# Patient Record
Sex: FEMALE | Race: WHITE | Employment: OTHER | URBAN - METROPOLITAN AREA
[De-identification: names, ages, dates, MRNs, and addresses within clinical notes are randomized per-mention and may not be internally consistent; named-entity substitution may affect disease eponyms.]

---

## 2017-01-01 ENCOUNTER — HOSPITAL ENCOUNTER (OUTPATIENT)
Dept: MAMMOGRAPHY | Facility: HOSPITAL | Age: 72
Discharge: HOME | End: 2017-01-01
Attending: INTERNAL MEDICINE
Payer: MEDICARE

## 2017-01-01 DIAGNOSIS — Z12.39 SCREENING FOR BREAST CANCER: ICD-10-CM

## 2017-01-01 PROCEDURE — 77063 BREAST TOMOSYNTHESIS BI: CPT | Performed by: INTERNAL MEDICINE

## 2017-01-01 PROCEDURE — 77067 SCR MAMMO BI INCL CAD: CPT | Performed by: INTERNAL MEDICINE

## 2017-01-10 ENCOUNTER — HOSPITAL ENCOUNTER (OUTPATIENT)
Dept: GENERAL RADIOLOGY | Facility: HOSPITAL | Age: 72
Discharge: HOME | End: 2017-01-10
Attending: INTERNAL MEDICINE
Payer: MEDICARE

## 2017-01-10 ENCOUNTER — LAB ENCOUNTER (OUTPATIENT)
Dept: LAB | Facility: HOSPITAL | Age: 72
End: 2017-01-10
Attending: INTERNAL MEDICINE
Payer: MEDICARE

## 2017-01-10 DIAGNOSIS — Z12.31 ENCOUNTER FOR MAMMOGRAM TO ESTABLISH BASELINE MAMMOGRAM: Primary | ICD-10-CM

## 2017-01-10 DIAGNOSIS — E78.6 LIPOPROTEIN DEFICIENCIES: ICD-10-CM

## 2017-01-10 DIAGNOSIS — Z12.31 ENCOUNTER FOR MAMMOGRAM TO ESTABLISH BASELINE MAMMOGRAM: ICD-10-CM

## 2017-01-10 DIAGNOSIS — E72.00: ICD-10-CM

## 2017-01-10 DIAGNOSIS — Z72.0 TOBACCO ABUSE: ICD-10-CM

## 2017-01-10 LAB
ALBUMIN SERPL BCP-MCNC: 3.9 G/DL (ref 3.5–4.8)
ALBUMIN SERPL BCP-MCNC: 3.9 G/DL (ref 3.5–4.8)
ALP SERPL-CCNC: 88 U/L (ref 32–100)
ALT SERPL-CCNC: 15 U/L (ref 14–54)
ANION GAP SERPL CALC-SCNC: 8 MMOL/L (ref 0–18)
AST SERPL-CCNC: 19 U/L (ref 15–41)
BILIRUB DIRECT SERPL-MCNC: 0.1 MG/DL (ref 0–0.2)
BILIRUB SERPL-MCNC: 0.8 MG/DL (ref 0.3–1.2)
BUN SERPL-MCNC: 15 MG/DL (ref 8–20)
BUN/CREAT SERPL: 18.5 (ref 10–20)
CALCIUM SERPL-MCNC: 9.2 MG/DL (ref 8.5–10.5)
CHLORIDE SERPL-SCNC: 102 MMOL/L (ref 95–110)
CHOLEST SERPL-MCNC: 195 MG/DL (ref 110–200)
CO2 SERPL-SCNC: 29 MMOL/L (ref 22–32)
CREAT SERPL-MCNC: 0.81 MG/DL (ref 0.5–1.5)
GLUCOSE SERPL-MCNC: 91 MG/DL (ref 70–99)
HDLC SERPL-MCNC: 49 MG/DL
LDLC SERPL CALC-MCNC: 124 MG/DL (ref 0–99)
NONHDLC SERPL-MCNC: 146 MG/DL
OSMOLALITY UR CALC.SUM OF ELEC: 288 MOSM/KG (ref 275–295)
PHOSPHATE SERPL-MCNC: 3.3 MG/DL (ref 2.4–4.7)
POTASSIUM SERPL-SCNC: 3.8 MMOL/L (ref 3.3–5.1)
PROT SERPL-MCNC: 6.8 G/DL (ref 5.9–8.4)
SODIUM SERPL-SCNC: 139 MMOL/L (ref 136–144)
TRIGL SERPL-MCNC: 109 MG/DL (ref 1–149)

## 2017-01-10 PROCEDURE — 80061 LIPID PANEL: CPT

## 2017-01-10 PROCEDURE — 71020 XR CHEST PA + LAT CHEST (CPT=71020): CPT

## 2017-01-10 PROCEDURE — 80076 HEPATIC FUNCTION PANEL: CPT

## 2017-01-10 PROCEDURE — 80069 RENAL FUNCTION PANEL: CPT

## 2017-01-10 PROCEDURE — 36415 COLL VENOUS BLD VENIPUNCTURE: CPT

## 2017-06-23 PROBLEM — E78.00 HIGH CHOLESTEROL: Status: ACTIVE | Noted: 2017-01-01

## 2018-01-01 ENCOUNTER — APPOINTMENT (OUTPATIENT)
Dept: CV DIAGNOSTICS | Facility: HOSPITAL | Age: 73
DRG: 291 | End: 2018-01-01
Attending: INTERNAL MEDICINE
Payer: MEDICARE

## 2018-01-01 ENCOUNTER — APPOINTMENT (OUTPATIENT)
Dept: GENERAL RADIOLOGY | Facility: HOSPITAL | Age: 73
DRG: 291 | End: 2018-01-01
Attending: EMERGENCY MEDICINE
Payer: MEDICARE

## 2018-01-01 ENCOUNTER — APPOINTMENT (OUTPATIENT)
Dept: CT IMAGING | Facility: HOSPITAL | Age: 73
DRG: 291 | End: 2018-01-01
Attending: EMERGENCY MEDICINE
Payer: MEDICARE

## 2018-01-01 ENCOUNTER — APPOINTMENT (OUTPATIENT)
Dept: GENERAL RADIOLOGY | Facility: HOSPITAL | Age: 73
DRG: 291 | End: 2018-01-01
Attending: INTERNAL MEDICINE
Payer: MEDICARE

## 2018-01-01 ENCOUNTER — HOSPITAL ENCOUNTER (INPATIENT)
Facility: HOSPITAL | Age: 73
LOS: 11 days | Discharge: HOSPICE | DRG: 291 | End: 2018-01-01
Attending: EMERGENCY MEDICINE | Admitting: INTERNAL MEDICINE
Payer: MEDICARE

## 2018-01-01 ENCOUNTER — APPOINTMENT (OUTPATIENT)
Dept: LAB | Facility: HOSPITAL | Age: 73
End: 2018-01-01
Attending: INTERNAL MEDICINE
Payer: MEDICARE

## 2018-01-01 VITALS
BODY MASS INDEX: 26.45 KG/M2 | WEIGHT: 141.88 LBS | RESPIRATION RATE: 34 BRPM | HEIGHT: 61.5 IN | TEMPERATURE: 101 F | SYSTOLIC BLOOD PRESSURE: 136 MMHG | HEART RATE: 83 BPM | OXYGEN SATURATION: 90 % | DIASTOLIC BLOOD PRESSURE: 41 MMHG

## 2018-01-01 DIAGNOSIS — Z13.228 ENCOUNTER FOR SCREENING FOR METABOLIC DISORDER: ICD-10-CM

## 2018-01-01 DIAGNOSIS — G93.1 HYPOXIC ENCEPHALOPATHY (HCC): ICD-10-CM

## 2018-01-01 DIAGNOSIS — Z13.1 SCREENING FOR DIABETES MELLITUS: ICD-10-CM

## 2018-01-01 DIAGNOSIS — E87.2 METABOLIC ACIDOSIS: ICD-10-CM

## 2018-01-01 DIAGNOSIS — Z13.220 ENCOUNTER FOR SCREENING FOR LIPID DISORDER: ICD-10-CM

## 2018-01-01 DIAGNOSIS — I46.9 CARDIAC ARREST (HCC): Primary | ICD-10-CM

## 2018-01-01 LAB
ADENOVIRUS PCR:: NEGATIVE
ALBUMIN SERPL BCP-MCNC: 1.5 G/DL (ref 3.5–4.8)
ALBUMIN SERPL BCP-MCNC: 1.6 G/DL (ref 3.5–4.8)
ALBUMIN SERPL BCP-MCNC: 3.4 G/DL (ref 3.5–4.8)
ALBUMIN/GLOB SERPL: 0.4 {RATIO} (ref 1–2)
ALBUMIN/GLOB SERPL: 1 {RATIO} (ref 1–2)
ALP SERPL-CCNC: 180 U/L (ref 32–100)
ALP SERPL-CCNC: 194 U/L (ref 32–100)
ALP SERPL-CCNC: 194 U/L (ref 32–100)
ALP SERPL-CCNC: 207 U/L (ref 32–100)
ALP SERPL-CCNC: 95 U/L (ref 32–100)
ALT SERPL-CCNC: 1006 U/L (ref 14–54)
ALT SERPL-CCNC: 23 U/L (ref 14–54)
ALT SERPL-CCNC: 345 U/L (ref 14–54)
ALT SERPL-CCNC: 524 U/L (ref 14–54)
ALT SERPL-CCNC: 877 U/L (ref 14–54)
ANION GAP SERPL CALC-SCNC: 11 MMOL/L (ref 0–18)
ANION GAP SERPL CALC-SCNC: 12 MMOL/L (ref 0–18)
ANION GAP SERPL CALC-SCNC: 15 MMOL/L (ref 0–18)
ANION GAP SERPL CALC-SCNC: 24 MMOL/L (ref 0–18)
ANION GAP SERPL CALC-SCNC: 6 MMOL/L (ref 0–18)
ANION GAP SERPL CALC-SCNC: 7 MMOL/L (ref 0–18)
ANION GAP SERPL CALC-SCNC: 7 MMOL/L (ref 0–18)
ANION GAP SERPL CALC-SCNC: 8 MMOL/L (ref 0–18)
ANION GAP SERPL CALC-SCNC: 9 MMOL/L (ref 0–18)
ANTIBODY SCREEN: NEGATIVE
APTT PPP: 27 SECONDS (ref 23.2–35.3)
APTT PPP: 28.9 SECONDS (ref 23.2–35.3)
AST SERPL-CCNC: 1035 U/L (ref 15–41)
AST SERPL-CCNC: 160 U/L (ref 15–41)
AST SERPL-CCNC: 27 U/L (ref 15–41)
AST SERPL-CCNC: 493 U/L (ref 15–41)
AST SERPL-CCNC: 83 U/L (ref 15–41)
B PERT DNA SPEC QL NAA+PROBE: NEGATIVE
BASE EXCESS BLD CALC-SCNC: -12.5 MMOL/L (ref ?–2)
BASE EXCESS BLD CALC-SCNC: -12.8 MMOL/L (ref ?–2)
BASE EXCESS BLD CALC-SCNC: -5.9 MMOL/L (ref ?–2)
BASE EXCESS BLD CALC-SCNC: -7 MMOL/L (ref ?–2)
BASE EXCESS BLD CALC-SCNC: -7.8 MMOL/L (ref ?–2)
BASE EXCESS BLD CALC-SCNC: -7.9 MMOL/L (ref ?–2)
BASOPHILS # BLD: 0 K/UL (ref 0–0.2)
BASOPHILS NFR BLD: 0 %
BILIRUB DIRECT SERPL-MCNC: 0.3 MG/DL (ref 0–0.2)
BILIRUB DIRECT SERPL-MCNC: 0.5 MG/DL (ref 0–0.2)
BILIRUB DIRECT SERPL-MCNC: 0.8 MG/DL (ref 0–0.2)
BILIRUB SERPL-MCNC: 0.7 MG/DL (ref 0.3–1.2)
BILIRUB SERPL-MCNC: 0.7 MG/DL (ref 0.3–1.2)
BILIRUB SERPL-MCNC: 0.9 MG/DL (ref 0.3–1.2)
BILIRUB SERPL-MCNC: 1 MG/DL (ref 0.3–1.2)
BILIRUB SERPL-MCNC: 1.5 MG/DL (ref 0.3–1.2)
BILIRUB UR QL: NEGATIVE
BUN SERPL-MCNC: 12 MG/DL (ref 8–20)
BUN SERPL-MCNC: 18 MG/DL (ref 8–20)
BUN SERPL-MCNC: 18 MG/DL (ref 8–20)
BUN SERPL-MCNC: 20 MG/DL (ref 8–20)
BUN SERPL-MCNC: 21 MG/DL (ref 8–20)
BUN SERPL-MCNC: 23 MG/DL (ref 8–20)
BUN SERPL-MCNC: 26 MG/DL (ref 8–20)
BUN SERPL-MCNC: 28 MG/DL (ref 8–20)
BUN SERPL-MCNC: 29 MG/DL (ref 8–20)
BUN SERPL-MCNC: 31 MG/DL (ref 8–20)
BUN SERPL-MCNC: 31 MG/DL (ref 8–20)
BUN/CREAT SERPL: 11.5 (ref 10–20)
BUN/CREAT SERPL: 13.3 (ref 10–20)
BUN/CREAT SERPL: 16 (ref 10–20)
BUN/CREAT SERPL: 20.2 (ref 10–20)
BUN/CREAT SERPL: 20.4 (ref 10–20)
BUN/CREAT SERPL: 20.6 (ref 10–20)
BUN/CREAT SERPL: 20.7 (ref 10–20)
BUN/CREAT SERPL: 22 (ref 10–20)
BUN/CREAT SERPL: 23 (ref 10–20)
BUN/CREAT SERPL: 23.4 (ref 10–20)
BUN/CREAT SERPL: 25 (ref 10–20)
C PNEUM DNA SPEC QL NAA+PROBE: NEGATIVE
CA-I BLD-SCNC: 1.12 MMOL/L (ref 0.95–1.32)
CALCIUM SERPL-MCNC: 6.8 MG/DL (ref 8.5–10.5)
CALCIUM SERPL-MCNC: 7 MG/DL (ref 8.5–10.5)
CALCIUM SERPL-MCNC: 7.2 MG/DL (ref 8.5–10.5)
CALCIUM SERPL-MCNC: 7.2 MG/DL (ref 8.5–10.5)
CALCIUM SERPL-MCNC: 7.4 MG/DL (ref 8.5–10.5)
CALCIUM SERPL-MCNC: 7.6 MG/DL (ref 8.5–10.5)
CALCIUM SERPL-MCNC: 7.7 MG/DL (ref 8.5–10.5)
CALCIUM SERPL-MCNC: 8.4 MG/DL (ref 8.5–10.5)
CALCIUM SERPL-MCNC: 9 MG/DL (ref 8.5–10.5)
CHLORIDE SERPL-SCNC: 100 MMOL/L (ref 95–110)
CHLORIDE SERPL-SCNC: 101 MMOL/L (ref 95–110)
CHLORIDE SERPL-SCNC: 103 MMOL/L (ref 95–110)
CHLORIDE SERPL-SCNC: 104 MMOL/L (ref 95–110)
CHLORIDE SERPL-SCNC: 106 MMOL/L (ref 95–110)
CHLORIDE SERPL-SCNC: 106 MMOL/L (ref 95–110)
CHLORIDE SERPL-SCNC: 107 MMOL/L (ref 95–110)
CHLORIDE SERPL-SCNC: 110 MMOL/L (ref 95–110)
CHLORIDE SERPL-SCNC: 94 MMOL/L (ref 95–110)
CHOLEST SERPL-MCNC: 161 MG/DL (ref 110–200)
CO2 SERPL-SCNC: 18 MMOL/L (ref 22–32)
CO2 SERPL-SCNC: 19 MMOL/L (ref 22–32)
CO2 SERPL-SCNC: 20 MMOL/L (ref 22–32)
CO2 SERPL-SCNC: 20 MMOL/L (ref 22–32)
CO2 SERPL-SCNC: 21 MMOL/L (ref 22–32)
CO2 SERPL-SCNC: 21 MMOL/L (ref 22–32)
CO2 SERPL-SCNC: 23 MMOL/L (ref 22–32)
CO2 SERPL-SCNC: 24 MMOL/L (ref 22–32)
CO2 SERPL-SCNC: 25 MMOL/L (ref 22–32)
CO2 SERPL-SCNC: 28 MMOL/L (ref 22–32)
CO2 SERPL-SCNC: 34 MMOL/L (ref 22–32)
COHGB MFR BLD: 1.8 % (ref 0–1.5)
COLOR UR: YELLOW
CORONAVIRUS 229E PCR:: NEGATIVE
CORONAVIRUS HKU1 PCR:: POSITIVE
CORONAVIRUS NL63 PCR:: NEGATIVE
CORONAVIRUS OC43 PCR:: NEGATIVE
CREAT SERPL-MCNC: 0.89 MG/DL (ref 0.5–1.5)
CREAT SERPL-MCNC: 0.9 MG/DL (ref 0.5–1.5)
CREAT SERPL-MCNC: 0.97 MG/DL (ref 0.5–1.5)
CREAT SERPL-MCNC: 1.03 MG/DL (ref 0.5–1.5)
CREAT SERPL-MCNC: 1.11 MG/DL (ref 0.5–1.5)
CREAT SERPL-MCNC: 1.24 MG/DL (ref 0.5–1.5)
CREAT SERPL-MCNC: 1.26 MG/DL (ref 0.5–1.5)
CREAT SERPL-MCNC: 1.35 MG/DL (ref 0.5–1.5)
CREAT SERPL-MCNC: 1.41 MG/DL (ref 0.5–1.5)
CREAT SERPL-MCNC: 1.44 MG/DL (ref 0.5–1.5)
CREAT SERPL-MCNC: 1.57 MG/DL (ref 0.5–1.5)
EOSINOPHIL # BLD: 0 K/UL (ref 0–0.7)
EOSINOPHIL # BLD: 0 K/UL (ref 0–0.7)
EOSINOPHIL # BLD: 0.2 K/UL (ref 0–0.7)
EOSINOPHIL # BLD: 0.2 K/UL (ref 0–0.7)
EOSINOPHIL # BLD: 0.3 K/UL (ref 0–0.7)
EOSINOPHIL NFR BLD: 0 %
EOSINOPHIL NFR BLD: 0 %
EOSINOPHIL NFR BLD: 1 %
EOSINOPHIL NFR BLD: 1 %
EOSINOPHIL NFR BLD: 2 %
ERYTHROCYTE [DISTWIDTH] IN BLOOD BY AUTOMATED COUNT: 13.6 % (ref 11–15)
ERYTHROCYTE [DISTWIDTH] IN BLOOD BY AUTOMATED COUNT: 13.7 % (ref 11–15)
ERYTHROCYTE [DISTWIDTH] IN BLOOD BY AUTOMATED COUNT: 13.8 % (ref 11–15)
ERYTHROCYTE [DISTWIDTH] IN BLOOD BY AUTOMATED COUNT: 13.9 % (ref 11–15)
ERYTHROCYTE [DISTWIDTH] IN BLOOD BY AUTOMATED COUNT: 14.3 % (ref 11–15)
ETHANOL SERPL-MCNC: 3 MG/DL
FLUAV RNA SPEC QL NAA+PROBE: NEGATIVE
FLUBV RNA SPEC QL NAA+PROBE: NEGATIVE
GLOBULIN PLAS-MCNC: 3.5 G/DL (ref 2.5–3.7)
GLOBULIN PLAS-MCNC: 4 G/DL (ref 2.5–3.7)
GLUCOSE BLDC GLUCOMTR-MCNC: 112 MG/DL (ref 70–99)
GLUCOSE BLDC GLUCOMTR-MCNC: 119 MG/DL (ref 70–99)
GLUCOSE BLDC GLUCOMTR-MCNC: 129 MG/DL (ref 70–99)
GLUCOSE BLDC GLUCOMTR-MCNC: 141 MG/DL (ref 70–99)
GLUCOSE BLDC GLUCOMTR-MCNC: 144 MG/DL (ref 70–99)
GLUCOSE BLDC GLUCOMTR-MCNC: 145 MG/DL (ref 70–99)
GLUCOSE BLDC GLUCOMTR-MCNC: 154 MG/DL (ref 70–99)
GLUCOSE BLDC GLUCOMTR-MCNC: 159 MG/DL (ref 70–99)
GLUCOSE BLDC GLUCOMTR-MCNC: 160 MG/DL (ref 70–99)
GLUCOSE BLDC GLUCOMTR-MCNC: 162 MG/DL (ref 70–99)
GLUCOSE BLDC GLUCOMTR-MCNC: 168 MG/DL (ref 70–99)
GLUCOSE BLDC GLUCOMTR-MCNC: 169 MG/DL (ref 70–99)
GLUCOSE BLDC GLUCOMTR-MCNC: 170 MG/DL (ref 70–99)
GLUCOSE BLDC GLUCOMTR-MCNC: 171 MG/DL (ref 70–99)
GLUCOSE BLDC GLUCOMTR-MCNC: 171 MG/DL (ref 70–99)
GLUCOSE BLDC GLUCOMTR-MCNC: 173 MG/DL (ref 70–99)
GLUCOSE BLDC GLUCOMTR-MCNC: 177 MG/DL (ref 70–99)
GLUCOSE BLDC GLUCOMTR-MCNC: 177 MG/DL (ref 70–99)
GLUCOSE BLDC GLUCOMTR-MCNC: 178 MG/DL (ref 70–99)
GLUCOSE BLDC GLUCOMTR-MCNC: 182 MG/DL (ref 70–99)
GLUCOSE BLDC GLUCOMTR-MCNC: 183 MG/DL (ref 70–99)
GLUCOSE BLDC GLUCOMTR-MCNC: 183 MG/DL (ref 70–99)
GLUCOSE BLDC GLUCOMTR-MCNC: 184 MG/DL (ref 70–99)
GLUCOSE BLDC GLUCOMTR-MCNC: 184 MG/DL (ref 70–99)
GLUCOSE BLDC GLUCOMTR-MCNC: 186 MG/DL (ref 70–99)
GLUCOSE BLDC GLUCOMTR-MCNC: 189 MG/DL (ref 70–99)
GLUCOSE BLDC GLUCOMTR-MCNC: 191 MG/DL (ref 70–99)
GLUCOSE BLDC GLUCOMTR-MCNC: 260 MG/DL (ref 70–99)
GLUCOSE SERPL-MCNC: 117 MG/DL (ref 70–99)
GLUCOSE SERPL-MCNC: 147 MG/DL (ref 70–99)
GLUCOSE SERPL-MCNC: 148 MG/DL (ref 70–99)
GLUCOSE SERPL-MCNC: 158 MG/DL (ref 70–99)
GLUCOSE SERPL-MCNC: 158 MG/DL (ref 70–99)
GLUCOSE SERPL-MCNC: 166 MG/DL (ref 70–99)
GLUCOSE SERPL-MCNC: 166 MG/DL (ref 70–99)
GLUCOSE SERPL-MCNC: 172 MG/DL (ref 70–99)
GLUCOSE SERPL-MCNC: 186 MG/DL (ref 70–99)
GLUCOSE SERPL-MCNC: 188 MG/DL (ref 70–99)
GLUCOSE SERPL-MCNC: 271 MG/DL (ref 70–99)
GLUCOSE UR-MCNC: 150 MG/DL
GRAN CASTS #/AREA UR COMP ASSIST: 42 /LPF
HCO3 BLDA-SCNC: 17.5 MEQ/L (ref 21–27)
HCO3 BLDA-SCNC: 17.5 MEQ/L (ref 21–27)
HCO3 BLDA-SCNC: 18.2 MEQ/L (ref 21–27)
HCO3 BLDA-SCNC: 18.6 MEQ/L (ref 21–27)
HCO3 BLDA-SCNC: 23.6 MEQ/L (ref 21–27)
HCO3 BLDA-SCNC: 23.7 MEQ/L (ref 21–27)
HCT VFR BLD AUTO: 26.1 % (ref 35–48)
HCT VFR BLD AUTO: 28.8 % (ref 35–48)
HCT VFR BLD AUTO: 32.3 % (ref 35–48)
HCT VFR BLD AUTO: 33.8 % (ref 35–48)
HCT VFR BLD AUTO: 37.8 % (ref 35–48)
HDLC SERPL-MCNC: 59 MG/DL
HGB BLD-MCNC: 10.9 G/DL (ref 12–16)
HGB BLD-MCNC: 11.1 G/DL (ref 12–16)
HGB BLD-MCNC: 11.6 G/DL (ref 12–16)
HGB BLD-MCNC: 12 G/DL (ref 12–16)
HGB BLD-MCNC: 8.7 G/DL (ref 12–16)
HGB BLD-MCNC: 9.7 G/DL (ref 12–16)
HYALINE CASTS #/AREA URNS AUTO: 154 /LPF
INR BLD: 1.5 (ref 0.9–1.2)
KETONES UR-MCNC: NEGATIVE MG/DL
L PNEUMO AG UR QL: NEGATIVE
LACTATE SERPL-SCNC: 1.9 MMOL/L (ref 0.5–2.2)
LACTATE SERPL-SCNC: 2.2 MMOL/L (ref 0.5–2.2)
LACTATE SERPL-SCNC: 2.4 MMOL/L (ref 0.5–2.2)
LACTATE SERPL-SCNC: 2.6 MMOL/L (ref 0.5–2.2)
LACTATE SERPL-SCNC: 2.7 MMOL/L (ref 0.5–2.2)
LACTATE SERPL-SCNC: 2.9 MMOL/L (ref 0.5–2.2)
LACTATE SERPL-SCNC: 2.9 MMOL/L (ref 0.5–2.2)
LACTATE SERPL-SCNC: 3 MMOL/L (ref 0.5–2.2)
LACTATE SERPL-SCNC: 3 MMOL/L (ref 0.5–2.2)
LACTATE SERPL-SCNC: 3.3 MMOL/L (ref 0.5–2.2)
LACTATE SERPL-SCNC: 3.4 MMOL/L (ref 0.5–2.2)
LDLC SERPL CALC-MCNC: 88 MG/DL (ref 0–99)
LEUKOCYTE ESTERASE UR QL STRIP.AUTO: NEGATIVE
LYMPHOCYTES # BLD: 0.2 K/UL (ref 1–4)
LYMPHOCYTES # BLD: 1 K/UL (ref 1–4)
LYMPHOCYTES # BLD: 1.1 K/UL (ref 1–4)
LYMPHOCYTES # BLD: 1.4 K/UL (ref 1–4)
LYMPHOCYTES # BLD: 1.8 K/UL (ref 1–4)
LYMPHOCYTES NFR BLD: 1 %
LYMPHOCYTES NFR BLD: 10 %
LYMPHOCYTES NFR BLD: 5 %
LYMPHOCYTES NFR BLD: 6 %
LYMPHOCYTES NFR BLD: 9 %
MAGNESIUM SERPL-MCNC: 1.8 MG/DL (ref 1.8–2.5)
MAGNESIUM SERPL-MCNC: 1.9 MG/DL (ref 1.8–2.5)
MAGNESIUM SERPL-MCNC: 2 MG/DL (ref 1.8–2.5)
MAGNESIUM SERPL-MCNC: 2 MG/DL (ref 1.8–2.5)
MAGNESIUM SERPL-MCNC: 2.2 MG/DL (ref 1.8–2.5)
MAGNESIUM SERPL-MCNC: 2.5 MG/DL (ref 1.8–2.5)
MCH RBC QN AUTO: 30 PG (ref 27–32)
MCH RBC QN AUTO: 30 PG (ref 27–32)
MCH RBC QN AUTO: 30.5 PG (ref 27–32)
MCH RBC QN AUTO: 30.5 PG (ref 27–32)
MCH RBC QN AUTO: 30.8 PG (ref 27–32)
MCHC RBC AUTO-ENTMCNC: 31.8 G/DL (ref 32–37)
MCHC RBC AUTO-ENTMCNC: 33.3 G/DL (ref 32–37)
MCHC RBC AUTO-ENTMCNC: 33.6 G/DL (ref 32–37)
MCHC RBC AUTO-ENTMCNC: 33.7 G/DL (ref 32–37)
MCHC RBC AUTO-ENTMCNC: 34.2 G/DL (ref 32–37)
MCV RBC AUTO: 89.3 FL (ref 80–100)
MCV RBC AUTO: 89.9 FL (ref 80–100)
MCV RBC AUTO: 90.1 FL (ref 80–100)
MCV RBC AUTO: 90.4 FL (ref 80–100)
MCV RBC AUTO: 95.8 FL (ref 80–100)
METAMYELOCYTES # BLD MANUAL: 0.19 K/UL
METAMYELOCYTES # BLD MANUAL: 0.37 K/UL
METAMYELOCYTES # BLD MANUAL: 0.42 K/UL
METAMYELOCYTES NFR BLD: 1 %
METAMYELOCYTES NFR BLD: 1 %
METAMYELOCYTES NFR BLD: 3 %
METAPNEUMOVIRUS PCR:: NEGATIVE
METHGB MFR BLD: 1 % SAT (ref 0.4–1.5)
MODIFIED ALLEN TEST: POSITIVE
MONOCYTES # BLD: 0 K/UL (ref 0–1)
MONOCYTES # BLD: 0.2 K/UL (ref 0–1)
MONOCYTES # BLD: 0.9 K/UL (ref 0–1)
MONOCYTES # BLD: 1 K/UL (ref 0–1)
MONOCYTES # BLD: 1.1 K/UL (ref 0–1)
MONOCYTES NFR BLD: 0 %
MONOCYTES NFR BLD: 1 %
MONOCYTES NFR BLD: 5 %
MONOCYTES NFR BLD: 5 %
MONOCYTES NFR BLD: 8 %
MRSA DNA SPEC QL NAA+PROBE: NEGATIVE
MYCOPLASMA PNEUMONIA PCR:: NEGATIVE
MYELOCYTES NFR BLD: 1 %
MYELOCYTES NFR BLD: 2 %
NEUTROPHILS # BLD AUTO: 10.7 K/UL (ref 1.8–7.7)
NEUTROPHILS # BLD AUTO: 16 K/UL (ref 1.8–7.7)
NEUTROPHILS # BLD AUTO: 17 K/UL (ref 1.8–7.7)
NEUTROPHILS # BLD AUTO: 18.3 K/UL (ref 1.8–7.7)
NEUTROPHILS # BLD AUTO: 23 K/UL (ref 1.8–7.7)
NEUTROPHILS NFR BLD: 54 %
NEUTROPHILS NFR BLD: 67 %
NEUTROPHILS NFR BLD: 78 %
NEUTROPHILS NFR BLD: 78 %
NEUTROPHILS NFR BLD: 82 %
NEUTS BAND NFR BLD: 10 %
NEUTS BAND NFR BLD: 10 %
NEUTS BAND NFR BLD: 15 %
NEUTS BAND NFR BLD: 36 %
NEUTS BAND NFR BLD: 8 %
NITRITE UR QL STRIP.AUTO: NEGATIVE
NONHDLC SERPL-MCNC: 102 MG/DL
O2 CT BLD-SCNC: 14.1 VOL% (ref 15–23)
O2 CT BLD-SCNC: 14.6 VOL% (ref 15–23)
O2 CT BLD-SCNC: 14.8 VOL% (ref 15–23)
O2 CT BLD-SCNC: 15.2 VOL% (ref 15–23)
O2 CT BLD-SCNC: 15.9 VOL% (ref 15–23)
O2 CT BLD-SCNC: 15.9 VOL% (ref 15–23)
O2/TOTAL GAS SETTING VFR VENT: 100 %
O2/TOTAL GAS SETTING VFR VENT: 40 %
OSMOLALITY UR CALC.SUM OF ELEC: 285 MOSM/KG (ref 275–295)
OSMOLALITY UR CALC.SUM OF ELEC: 286 MOSM/KG (ref 275–295)
OSMOLALITY UR CALC.SUM OF ELEC: 287 MOSM/KG (ref 275–295)
OSMOLALITY UR CALC.SUM OF ELEC: 288 MOSM/KG (ref 275–295)
OSMOLALITY UR CALC.SUM OF ELEC: 289 MOSM/KG (ref 275–295)
OSMOLALITY UR CALC.SUM OF ELEC: 290 MOSM/KG (ref 275–295)
OSMOLALITY UR CALC.SUM OF ELEC: 291 MOSM/KG (ref 275–295)
OSMOLALITY UR CALC.SUM OF ELEC: 292 MOSM/KG (ref 275–295)
OSMOLALITY UR CALC.SUM OF ELEC: 293 MOSM/KG (ref 275–295)
OSMOLALITY UR CALC.SUM OF ELEC: 294 MOSM/KG (ref 275–295)
OSMOLALITY UR CALC.SUM OF ELEC: 298 MOSM/KG (ref 275–295)
PARAINFLUENZA 1 PCR:: NEGATIVE
PARAINFLUENZA 2 PCR:: NEGATIVE
PARAINFLUENZA 3 PCR:: NEGATIVE
PARAINFLUENZA 4 PCR:: NEGATIVE
PCO2 BLDA: 29 MM HG (ref 35–45)
PCO2 BLDA: 37 MM HG (ref 35–45)
PCO2 BLDA: 63 MM HG (ref 35–45)
PCO2 BLDA: 72 MM HG (ref 35–45)
PCO2 BLDA: 85 MM HG (ref 35–45)
PCO2 BLDA: 85 MM HG (ref 35–45)
PEEP SETTING VENT: 5 CM H2O
PH BLDA: 7.04 [PH] (ref 7.35–7.45)
PH BLDA: 7.07 [PH] (ref 7.35–7.45)
PH BLDA: 7.31 [PH] (ref 7.35–7.45)
PH BLDA: 7.4 [PH] (ref 7.35–7.45)
PH UR: 5 [PH] (ref 5–8)
PHENYTOIN SERPL-MCNC: 5.8 MCG/ML (ref 10–20)
PHENYTOIN SERPL-MCNC: 5.8 MCG/ML (ref 10–20)
PHENYTOIN SERPL-MCNC: 7.6 MCG/ML (ref 10–20)
PHOSPHATE SERPL-MCNC: 2.3 MG/DL (ref 2.4–4.7)
PHOSPHATE SERPL-MCNC: 4.3 MG/DL (ref 2.4–4.7)
PLATELET # BLD AUTO: 193 K/UL (ref 140–400)
PLATELET # BLD AUTO: 213 K/UL (ref 140–400)
PLATELET # BLD AUTO: 285 K/UL (ref 140–400)
PLATELET # BLD AUTO: 288 K/UL (ref 140–400)
PLATELET # BLD AUTO: 389 K/UL (ref 140–400)
PMV BLD AUTO: 7.2 FL (ref 7.4–10.3)
PMV BLD AUTO: 7.4 FL (ref 7.4–10.3)
PMV BLD AUTO: 7.7 FL (ref 7.4–10.3)
PMV BLD AUTO: 7.8 FL (ref 7.4–10.3)
PMV BLD AUTO: 8.3 FL (ref 7.4–10.3)
PO2 BLDA: 100 MM HG (ref 80–100)
PO2 BLDA: 100 MM HG (ref 80–100)
PO2 BLDA: 143 MM HG (ref 80–100)
PO2 BLDA: 154 MM HG (ref 80–100)
PO2 BLDA: 67 MM HG (ref 80–100)
PO2 BLDA: 91 MM HG (ref 80–100)
PO2 SATN ADJ TO 0.5 BLD: 22 MMHG (ref 24–28)
PO2 SATN ADJ TO 0.5 BLD: 29 MMHG (ref 24–28)
PO2 SATN ADJ TO 0.5 BLD: 38 MMHG (ref 24–28)
PO2 SATN ADJ TO 0.5 BLD: 38 MMHG (ref 24–28)
PO2 SATN ADJ TO 0.5 BLD: 39 MMHG (ref 24–28)
PO2 SATN ADJ TO 0.5 BLD: 39 MMHG (ref 24–28)
POTASSIUM (BLOOD GAS): 3.9 MMOL/L (ref 3.3–5.1)
POTASSIUM SERPL-SCNC: 2.9 MMOL/L (ref 3.3–5.1)
POTASSIUM SERPL-SCNC: 3.2 MMOL/L (ref 3.3–5.1)
POTASSIUM SERPL-SCNC: 3.4 MMOL/L (ref 3.3–5.1)
POTASSIUM SERPL-SCNC: 3.6 MMOL/L (ref 3.3–5.1)
POTASSIUM SERPL-SCNC: 3.6 MMOL/L (ref 3.3–5.1)
POTASSIUM SERPL-SCNC: 3.7 MMOL/L (ref 3.3–5.1)
POTASSIUM SERPL-SCNC: 3.9 MMOL/L (ref 3.3–5.1)
POTASSIUM SERPL-SCNC: 4 MMOL/L (ref 3.3–5.1)
POTASSIUM SERPL-SCNC: 4.1 MMOL/L (ref 3.3–5.1)
POTASSIUM SERPL-SCNC: 4.5 MMOL/L (ref 3.3–5.1)
POTASSIUM SERPL-SCNC: 5 MMOL/L (ref 3.3–5.1)
PROT SERPL-MCNC: 4.8 G/DL (ref 5.9–8.4)
PROT SERPL-MCNC: 5.1 G/DL (ref 5.9–8.4)
PROT SERPL-MCNC: 5.5 G/DL (ref 5.9–8.4)
PROT SERPL-MCNC: 5.6 G/DL (ref 5.9–8.4)
PROT SERPL-MCNC: 6.9 G/DL (ref 5.9–8.4)
PROT UR-MCNC: 100 MG/DL
PROTHROMBIN TIME: 17.3 SECONDS (ref 11.8–14.5)
PUNCTURE CHARGE: YES
RBC # BLD AUTO: 2.9 M/UL (ref 3.7–5.4)
RBC # BLD AUTO: 3.22 M/UL (ref 3.7–5.4)
RBC # BLD AUTO: 3.58 M/UL (ref 3.7–5.4)
RBC # BLD AUTO: 3.76 M/UL (ref 3.7–5.4)
RBC # BLD AUTO: 3.94 M/UL (ref 3.7–5.4)
RBC #/AREA URNS AUTO: 2 /HPF
RESP RATE: 14 BPM
RESP RATE: 18 BPM
RESP RATE: 18 BPM
RESP RATE: 28 BPM
RH BLOOD TYPE: POSITIVE
RHINOVIRUS/ENTERO PCR:: NEGATIVE
RSV RNA SPEC QL NAA+PROBE: NEGATIVE
SAO2 % BLDA: 92.8 % (ref 94–100)
SAO2 % BLDA: 94.4 % (ref 94–100)
SAO2 % BLDA: 94.6 % (ref 94–100)
SAO2 % BLDA: 95.4 % (ref 94–100)
SAO2 % BLDA: >98.5 % (ref 94–100)
SAO2 % BLDA: >98.5 % (ref 94–100)
SODIUM (BLOOD GAS): 140 MMOL/L (ref 135–145)
SODIUM SERPL-SCNC: 132 MMOL/L (ref 136–144)
SODIUM SERPL-SCNC: 135 MMOL/L (ref 136–144)
SODIUM SERPL-SCNC: 136 MMOL/L (ref 136–144)
SODIUM SERPL-SCNC: 136 MMOL/L (ref 136–144)
SODIUM SERPL-SCNC: 137 MMOL/L (ref 136–144)
SODIUM SERPL-SCNC: 138 MMOL/L (ref 136–144)
SODIUM SERPL-SCNC: 139 MMOL/L (ref 136–144)
SODIUM SERPL-SCNC: 141 MMOL/L (ref 136–144)
SP GR UR STRIP: 1.03 (ref 1–1.03)
SPECIMEN VOL 24H UR: 450 ML
SPECIMEN VOL 24H UR: 550 ML
TRIGL SERPL-MCNC: 70 MG/DL (ref 1–149)
TROPONIN I SERPL-MCNC: 0.19 NG/ML (ref ?–0.03)
TROPONIN I SERPL-MCNC: 0.23 NG/ML (ref ?–0.03)
TROPONIN I SERPL-MCNC: 0.33 NG/ML (ref ?–0.03)
TROPONIN I SERPL-MCNC: 0.35 NG/ML (ref ?–0.03)
UROBILINOGEN UR STRIP-ACNC: 4
VALPROATE SERPL-MCNC: 50 MCG/ML (ref 50–100)
VALPROATE SERPL-MCNC: 68 MCG/ML (ref 50–100)
VIT C UR-MCNC: 40 MG/DL
WBC # BLD AUTO: 13.9 K/UL (ref 4–11)
WBC # BLD AUTO: 18.6 K/UL (ref 4–11)
WBC # BLD AUTO: 19.4 K/UL (ref 4–11)
WBC # BLD AUTO: 20.3 K/UL (ref 4–11)
WBC # BLD AUTO: 23.7 K/UL (ref 4–11)
WBC #/AREA URNS AUTO: 4 /HPF

## 2018-01-01 PROCEDURE — 71260 CT THORAX DX C+: CPT | Performed by: EMERGENCY MEDICINE

## 2018-01-01 PROCEDURE — 93306 TTE W/DOPPLER COMPLETE: CPT | Performed by: INTERNAL MEDICINE

## 2018-01-01 PROCEDURE — 99356 PROLONGED SERV,INPATIENT,1ST HR: CPT | Performed by: REGISTERED NURSE

## 2018-01-01 PROCEDURE — 99233 SBSQ HOSP IP/OBS HIGH 50: CPT | Performed by: REGISTERED NURSE

## 2018-01-01 PROCEDURE — 99231 SBSQ HOSP IP/OBS SF/LOW 25: CPT | Performed by: OTHER

## 2018-01-01 PROCEDURE — 88175 CYTOPATH C/V AUTO FLUID REDO: CPT | Performed by: INTERNAL MEDICINE

## 2018-01-01 PROCEDURE — 99232 SBSQ HOSP IP/OBS MODERATE 35: CPT | Performed by: OTHER

## 2018-01-01 PROCEDURE — 71045 X-RAY EXAM CHEST 1 VIEW: CPT | Performed by: INTERNAL MEDICINE

## 2018-01-01 PROCEDURE — 74177 CT ABD & PELVIS W/CONTRAST: CPT | Performed by: EMERGENCY MEDICINE

## 2018-01-01 PROCEDURE — 95822 EEG COMA OR SLEEP ONLY: CPT | Performed by: OTHER

## 2018-01-01 PROCEDURE — 80053 COMPREHEN METABOLIC PANEL: CPT

## 2018-01-01 PROCEDURE — 95816 EEG AWAKE AND DROWSY: CPT | Performed by: OTHER

## 2018-01-01 PROCEDURE — 99223 1ST HOSP IP/OBS HIGH 75: CPT | Performed by: REGISTERED NURSE

## 2018-01-01 PROCEDURE — 02HV33Z INSERTION OF INFUSION DEVICE INTO SUPERIOR VENA CAVA, PERCUTANEOUS APPROACH: ICD-10-PCS | Performed by: INTERNAL MEDICINE

## 2018-01-01 PROCEDURE — 71045 X-RAY EXAM CHEST 1 VIEW: CPT | Performed by: EMERGENCY MEDICINE

## 2018-01-01 PROCEDURE — 36415 COLL VENOUS BLD VENIPUNCTURE: CPT

## 2018-01-01 PROCEDURE — 99223 1ST HOSP IP/OBS HIGH 75: CPT | Performed by: OTHER

## 2018-01-01 PROCEDURE — 5A1945Z RESPIRATORY VENTILATION, 24-96 CONSECUTIVE HOURS: ICD-10-PCS | Performed by: INTERNAL MEDICINE

## 2018-01-01 PROCEDURE — 70450 CT HEAD/BRAIN W/O DYE: CPT | Performed by: EMERGENCY MEDICINE

## 2018-01-01 PROCEDURE — 80061 LIPID PANEL: CPT

## 2018-01-01 RX ORDER — 0.9 % SODIUM CHLORIDE 0.9 %
VIAL (ML) INJECTION
Status: DISPENSED
Start: 2018-01-01 | End: 2018-01-01

## 2018-01-01 RX ORDER — MAGNESIUM SULFATE HEPTAHYDRATE 40 MG/ML
2 INJECTION, SOLUTION INTRAVENOUS ONCE
Status: COMPLETED | OUTPATIENT
Start: 2018-01-01 | End: 2018-01-01

## 2018-01-01 RX ORDER — LORAZEPAM 2 MG/ML
1 INJECTION INTRAMUSCULAR ONCE
Status: COMPLETED | OUTPATIENT
Start: 2018-01-01 | End: 2018-01-01

## 2018-01-01 RX ORDER — 0.9 % SODIUM CHLORIDE 0.9 %
VIAL (ML) INJECTION
Status: COMPLETED
Start: 2018-01-01 | End: 2018-01-01

## 2018-01-01 RX ORDER — SODIUM CHLORIDE 9 MG/ML
INJECTION, SOLUTION INTRAVENOUS CONTINUOUS
Status: DISCONTINUED | OUTPATIENT
Start: 2018-01-01 | End: 2018-01-01

## 2018-01-01 RX ORDER — MORPHINE SULFATE 4 MG/ML
4 INJECTION, SOLUTION INTRAMUSCULAR; INTRAVENOUS
Status: DISCONTINUED | OUTPATIENT
Start: 2018-01-01 | End: 2018-01-01

## 2018-01-01 RX ORDER — POTASSIUM CHLORIDE 29.8 MG/ML
40 INJECTION INTRAVENOUS ONCE
Status: COMPLETED | OUTPATIENT
Start: 2018-01-01 | End: 2018-01-01

## 2018-01-01 RX ORDER — ASPIRIN 325 MG
325 TABLET, DELAYED RELEASE (ENTERIC COATED) ORAL DAILY
Status: DISCONTINUED | OUTPATIENT
Start: 2018-01-01 | End: 2018-01-01

## 2018-01-01 RX ORDER — POTASSIUM CHLORIDE 20 MEQ/1
40 TABLET, EXTENDED RELEASE ORAL EVERY 4 HOURS
Status: COMPLETED | OUTPATIENT
Start: 2018-01-01 | End: 2018-01-01

## 2018-01-01 RX ORDER — SODIUM CHLORIDE 9 MG/ML
INJECTION, SOLUTION INTRAVENOUS
Status: DISCONTINUED
Start: 2018-01-01 | End: 2018-01-01 | Stop reason: WASHOUT

## 2018-01-01 RX ORDER — HEPARIN SODIUM 5000 [USP'U]/ML
5000 INJECTION, SOLUTION INTRAVENOUS; SUBCUTANEOUS EVERY 8 HOURS SCHEDULED
Status: DISCONTINUED | OUTPATIENT
Start: 2018-01-01 | End: 2018-01-01

## 2018-01-01 RX ORDER — SODIUM CHLORIDE 0.9 % (FLUSH) 0.9 %
10 SYRINGE (ML) INJECTION AS NEEDED
Status: DISCONTINUED | OUTPATIENT
Start: 2018-01-01 | End: 2018-01-01

## 2018-01-01 RX ORDER — PHENYTOIN SODIUM 50 MG/ML
100 INJECTION, SOLUTION INTRAMUSCULAR; INTRAVENOUS EVERY 8 HOURS SCHEDULED
Status: DISCONTINUED | OUTPATIENT
Start: 2018-01-01 | End: 2018-01-01

## 2018-01-01 RX ORDER — SODIUM CHLORIDE 9 MG/ML
INJECTION, SOLUTION INTRAVENOUS
Status: COMPLETED
Start: 2018-01-01 | End: 2018-01-01

## 2018-01-01 RX ORDER — LORAZEPAM 2 MG/ML
2 INJECTION INTRAMUSCULAR EVERY 4 HOURS PRN
Status: DISCONTINUED | OUTPATIENT
Start: 2018-01-01 | End: 2018-01-01

## 2018-01-01 RX ORDER — SODIUM CHLORIDE 9 MG/ML
INJECTION, SOLUTION INTRAVENOUS CONTINUOUS
Status: DISPENSED | OUTPATIENT
Start: 2018-01-01 | End: 2018-01-01

## 2018-01-01 RX ORDER — LORAZEPAM 2 MG/ML
1 INJECTION INTRAMUSCULAR
Status: DISCONTINUED | OUTPATIENT
Start: 2018-01-01 | End: 2018-01-01

## 2018-01-01 RX ORDER — MORPHINE SULFATE 2 MG/ML
1 INJECTION, SOLUTION INTRAMUSCULAR; INTRAVENOUS
Status: DISCONTINUED | OUTPATIENT
Start: 2018-01-01 | End: 2018-01-01

## 2018-01-01 RX ORDER — SCOLOPAMINE TRANSDERMAL SYSTEM 1 MG/1
1 PATCH, EXTENDED RELEASE TRANSDERMAL
Status: DISCONTINUED | OUTPATIENT
Start: 2018-01-01 | End: 2018-01-01

## 2018-01-01 RX ORDER — FUROSEMIDE 10 MG/ML
40 INJECTION INTRAMUSCULAR; INTRAVENOUS DAILY
Status: DISCONTINUED | OUTPATIENT
Start: 2018-01-01 | End: 2018-01-01

## 2018-01-01 RX ORDER — METOPROLOL TARTRATE 5 MG/5ML
2.5 INJECTION INTRAVENOUS EVERY 6 HOURS
Status: DISCONTINUED | OUTPATIENT
Start: 2018-01-01 | End: 2018-01-01

## 2018-01-01 RX ORDER — MORPHINE SULFATE 2 MG/ML
2 INJECTION, SOLUTION INTRAMUSCULAR; INTRAVENOUS
Status: DISCONTINUED | OUTPATIENT
Start: 2018-01-01 | End: 2018-01-01

## 2018-01-01 RX ORDER — LORAZEPAM 2 MG/ML
1 INJECTION INTRAMUSCULAR EVERY 30 MIN PRN
Status: DISCONTINUED | OUTPATIENT
Start: 2018-01-01 | End: 2018-01-01

## 2018-01-01 RX ORDER — LORAZEPAM 2 MG/ML
1 INJECTION INTRAMUSCULAR EVERY 4 HOURS PRN
Status: DISCONTINUED | OUTPATIENT
Start: 2018-01-01 | End: 2018-01-01

## 2018-01-01 RX ORDER — DEXTROSE MONOHYDRATE 50 MG/ML
INJECTION, SOLUTION INTRAVENOUS CONTINUOUS
Status: DISCONTINUED | OUTPATIENT
Start: 2018-01-01 | End: 2018-01-01

## 2018-01-01 RX ORDER — MINERAL OIL AND PETROLATUM 150; 830 MG/G; MG/G
OINTMENT OPHTHALMIC 2 TIMES DAILY
Status: DISCONTINUED | OUTPATIENT
Start: 2018-01-01 | End: 2018-01-01

## 2018-01-01 RX ORDER — FUROSEMIDE 10 MG/ML
20 INJECTION INTRAMUSCULAR; INTRAVENOUS ONCE
Status: COMPLETED | OUTPATIENT
Start: 2018-01-01 | End: 2018-01-01

## 2018-01-01 RX ORDER — LORAZEPAM 2 MG/ML
INJECTION INTRAMUSCULAR
Status: COMPLETED
Start: 2018-01-01 | End: 2018-01-01

## 2018-01-01 RX ORDER — GLYCOPYRROLATE 0.2 MG/ML
0.2 INJECTION, SOLUTION INTRAMUSCULAR; INTRAVENOUS
Status: DISCONTINUED | OUTPATIENT
Start: 2018-01-01 | End: 2018-01-01

## 2018-01-01 RX ORDER — LORAZEPAM 2 MG/ML
0.5 INJECTION INTRAMUSCULAR EVERY 4 HOURS PRN
Status: DISCONTINUED | OUTPATIENT
Start: 2018-01-01 | End: 2018-01-01

## 2018-01-02 PROBLEM — C50.412 MALIGNANT NEOPLASM OF UPPER-OUTER QUADRANT OF LEFT FEMALE BREAST (HCC): Status: ACTIVE | Noted: 2018-01-01

## 2018-01-08 PROBLEM — R73.9 HYPERGLYCEMIA: Status: ACTIVE | Noted: 2018-01-01

## 2018-01-08 PROBLEM — I46.9 CARDIAC ARREST (HCC): Status: ACTIVE | Noted: 2018-01-01

## 2018-01-08 PROBLEM — E87.2 METABOLIC ACIDOSIS: Status: ACTIVE | Noted: 2018-01-01

## 2018-01-08 PROBLEM — N17.9 ACUTE RENAL FAILURE (ARF) (HCC): Status: ACTIVE | Noted: 2018-01-01

## 2018-01-08 NOTE — PROGRESS NOTES
01/08/18 1127   Clinical Encounter Type   Visited With Family   Routine Visit Follow-up   Continue Visiting Yes   Crisis Visit ED   Rastafari Encounters   Rastafari Needs Prayer   Sacramental Encounters   Sacrament of Sick-Anointing Anointed   Patient S

## 2018-01-08 NOTE — PROGRESS NOTES
Pharmacy was consulted to dose Zosyn (piperacillin/tazobactam) for treatment of sepsis by Dr. Mary Malik. There is no height or weight on file to calculate BMI.   Wt Readings from Last 6 Encounters:  01/02/18 : 122 lb 11.2 oz  06/23/17 : 123 lb 6.4 oz

## 2018-01-08 NOTE — CODE DOCUMENTATION
Patient arrives at [place] [address] intubated by medics with pulse. ET tube 20 at the lips. Size 6.5 tube. 1 epi given en route. IO to left tibia. #20 initiated by Tamica Mccrary RN to right [facility]. Fluids infusing.     Vent settings rate 14, 450 100%, peep 5

## 2018-01-08 NOTE — PROGRESS NOTES
PICC insertion request  1/8/18 for multiple infusions:  Vein assessment per site [facility] U/S shows Right upper arm with no accessible vein. Pt noted on max dose vasopressors and on cooling/hypothermia protocol.  Left upper arm restriction due to lumpectomy wit

## 2018-01-08 NOTE — RESPIRATORY THERAPY NOTE
Transported on vent from ED to 221, switched to regular vent in  ICU. ICU RT at bed side, report given.

## 2018-01-08 NOTE — PROGRESS NOTES
Phlebotomy unable to obtain lab specimens. Patient without central line. Dr. Dominick Szymanski notified and will place central line today.

## 2018-01-08 NOTE — CONSULTS
Critical Care Consult     Assessment / Plan:  1. Cardiac arrest - unclear cause  - CTA chest   - cards to evaluate  - hypothermia protocol  2. Shock  - wean norepi as able  - IVFs  - check lactic acid  - Zosyn  3.  Acute respiratory failure  - continue full 01/08/18  0758 01/08/18  0759 01/08/18  0809 01/08/18  0830   BP: 147/85  131/48 (!) 86/46   Pulse: 115  115 97   Resp: 23  21 22   Temp:  97.9 °F (36.6 °C)     TempSrc:  Rectal     SpO2: 95%  97% 91%     General: intubated  HEENT: OP clear  Respiratory: c

## 2018-01-08 NOTE — PROCEDURES
Central Venous Catheter Placement Note    Indication: Vascular access    Location: Right femoral vein    Catheter type: Triple lumen catheter    Pre-procedure: Consented was obtained and documented from sister. Procedural pause performed.  Vital signs, l

## 2018-01-08 NOTE — H&P
[address] Patient Status:  Inpatient    1945 MRN [id number]   Location [facility] 2W/ Attending Praneeth Calvert MD   Hosp Day # 0 PCP Sánchez Sheldon MD     Date:  2018  Babatunde Problem Relation Age of Onset   • Hypertension Father    • Cancer Mother    • Colon Cancer Mother    • Colon Cancer Brother    • Breast Cancer Self 65     Social History:  Smoking status: Current Every Day Smoker 01/08/2018   CO2 23 01/08/2018    (H) 01/08/2018   CA 6.8 (L) 01/08/2018   ALB 3.4 (L) 01/03/2018   ALKPHO 95 01/03/2018   BILT 0.9 01/03/2018   TP 6.9 01/03/2018   AST 27 01/03/2018   ALT 23 01/03/2018   INR 1.5 (H) 01/08/2018   PHOS 3.3 01/10/2017 lower lobe air space opacities suggestive of atelectasis.  Underlying bacterial pneumonia is felt to be less likely but is also in the differential.        Ct Abdomen Pelvis Iv Contrast, No Oral (er)    Result Date: 1/8/2018  CONCLUSION: No acute process in

## 2018-01-08 NOTE — PROGRESS NOTES
ASSESSMENT/PLAN:    Impression: 1 cardiac arrest in a 20-year-old female with no prior cardiac history is currently intubated and unresponsive. 2.  Tobacco abuse 3. Elevated troponin.     Plan: She is going to be continued on a ventilator and cooling p Problem Relation Age of Onset   • Hypertension Father    • Cancer Mother    • Colon Cancer Mother    • Colon Cancer Brother    • Breast Cancer Self 65      Family History of Premature CAD: No   Social History:  Smoking status: Current Every Day Smoker
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
## 2018-01-09 PROBLEM — R57.0 CARDIOGENIC SHOCK (HCC): Status: ACTIVE | Noted: 2018-01-01

## 2018-01-09 NOTE — PROGRESS NOTES
ASSESSMENT/PLAN:    Impression: 1 cardiac arrest in a 18-year-old female with no prior cardiac history is currently intubated and unresponsive. Requiring pressors for bp support  2. Tobacco abuse   3. Elevated troponin.    4. resp failure; corona virus SpO2 100%   BMI 26.03 kg/m²   CONS: well developed, well nourished. WEIGHT: HEAD/FACE:no trauma, normocephalic. EYES:conjunctiva not injected, no xanthelasma. ENT:mucosa pink and moist. NECK:jugular venous pressure not elevated.  RESP:normal rate and rhyt

## 2018-01-09 NOTE — PROGRESS NOTES
Josselin Ma Patient Status:  Inpatient    1945 MRN [id number]   Location [facility] 2W/SW Attending Finesse Ramirez MD   Hosp Day # 1 PCP Otilio Peraza MD     Critical Care Progress Note    Assessment/Plan:  1.  Cardiac arrest - u wall: No tenderness or deformity. Heart: Regular rate and rhythm, normal S1S2, no murmur. Abdomen: soft, non-tender, non-distended, positive BS. Extremity: No clubbing or cyanosis no edema. Skin: No rashes or lesions.     Recent Labs   Lab  01/09/18

## 2018-01-09 NOTE — PAYOR COMM NOTE
Admit Orders     Start     Ordered    01/08/18 1154  Admit to inpatient Once  ([address])  Once     Ordering Provider:  Allie Phillips MD   Question Answer Comment   Diagnosis Cardiac arrest [facility])    Level of Care ICU    Bed She had return of spontaneous circulation during transport. [BF.1]    Past Medical History:   Diagnosis Date   • Breast cancer ([facility] [address] 2003    lt   • Hyperlipidemia    • Osteoarthritis        Past Surgical History:  No date: COLONOSCOPY  2003: LUMPECTOMY LEFT the following:        Result Value    Clarity Urine Cloudy (*)     Protein Urine 100  (*)     Glucose Urine 150  (*)     Blood Urine Moderate (*)     Urobilinogen Urine 4.0 (*)     Ascorbic Acid Urine 40  (*)     Hyaline Casts 154 (*)     Granular Casts 42 Final result                 Please view results for these tests on the individual orders. TROPONIN I   ARTERIAL BLOOD GAS   TYPE AND SCREEN    Narrative: The following orders were created for panel order TYPE AND SCREEN.   Procedure effacement is apparent. There is no space-occupying lesion, mass effect, or shift of midline structures. The gray-white matter junction is preserved and bilaterally symmetric in appearance.   Scattered periventricular and subcortical white matter hypodensit LAT CHEST (SSF=74706), 1/10/2017, 7:42. INDICATIONS: Full arrest. History of breast cancer. TECHNIQUE:   Single AP view was performed. FINDINGS:   CARDIAC/MEDIASTINUM:   The cardiac silhouette is enlarged and unchanged.  There is atherosclerotic calcifi are a few enlarged mediastinal lymph nodes. For example a 2.7 x 1.2 cm prevascular lymph node and a 2.2 x 1.7 cm subcarinal lymph node. There are multiple other mildly prominent mediastinal lymph nodes which are nonspecific  and up to 1 cm in short axis.  Aminta Sloan subsegmental pulmonary arteries due to respiratory motion artifact. Moderate centrilobular emphysematous changes seen in the bilateral upper lobes.   Innumerable reticulonodular opacities in the bilateral upper and lower lobe suggestive of superimposed inf hydronephrosis. 3.1 cm left lower pole renal cyst. AORTA/VASCULAR:   There is atherosclerotic calcification of the abdominal aorta extending into bilateral iliac arteries. Small amount of air within a few left groin veins, likely secondary to injection.  R performing a physical exam, bedside monitoring of interventions, collecting and interpreting tests and discussion with consultants but not including time spent performing procedures.   Disposition and Plan     Clinical Impression:[BF.1]  Cardiac arrest ([facility] [address] Mendez Lange [facility] #[PM.1] 0[PM.2] PCP[PM.1] [facility] MD Song[PM.2]     Date:  1/8/2018  Date of Admission:  1/8/2018    History provided by emergency room physician, patient's family, chart.   Chief Complaint:[PM.1]   Patient presents with:  Cristel Barroso Cancer Brother    • Breast Cancer Self 62[PM.2]     Social History:[PM.1]  Smoking status: Current Every Day Smoker                                                   Packs/day: 1.00      Years: 50.00        Types: Cigarettes  Smokeless tobacco: Never Used (L) 01/08/2018   ALB 3.4 (L) 01/03/2018   ALKPHO 95 01/03/2018   BILT 0.9 01/03/2018   TP 6.9 01/03/2018   AST 27 01/03/2018   ALT 23 01/03/2018   INR 1.5 (H) 01/08/2018   PHOS 3.3 01/10/2017   TROP 0.35 (HH) 01/08/2018   ETOH 3 01/08/2018       Ct Brain O bacterial pneumonia is felt to be less likely but is also in the differential.        Ct Abdomen Pelvis Iv Contrast, No Oral (er)    Result Date: 1/8/2018  CONCLUSION: No acute process in the abdomen or pelvis.          Ekg 12-lead    Result Date: 1/8/2018 1/8/2018 2025 Given (none) Both Eyes Shira Gale RN    1/8/2018 1719 Given (none) Both Eyes Cinda Cisneros RN      calcium gluconate 1 g in sodium chloride 0.9 % 100 mL IVPB     Date Action Dose Route User    1/8/2018 2032 New Bag 1 g Intra RN    1/8/2018 2300 Rate/Dose Change 0.4 mcg/kg/min × 63.5 kg Intravenous Dorice Silvio, RN    1/8/2018 2245 Rate/Dose Change 0.3 mcg/kg/min × 63.5 kg Intravenous Dorice Silvio, RN    1/8/2018 2230 Rate/Dose Change 0.225 mcg/kg/min × 63.5 kg Intravenou Emmanuel Padron, RN      fentaNYL citrate (SUBLIMAZE) 1,000 mcg in sodium chloride 0.9 % 100 mL infusion     Date Action Dose Route User    1/9/2018 1200 Rate/Dose Verify 50 mcg/hr Intravenous Evan Randall RN    1/9/2018 1000 Rate/Dose Verify 50 mcg/hr I infusion     Date Action Dose Route User    1/9/2018 1200 Rate/Dose Change 5 mcg/min Intravenous Jacob Givens RN    1/9/2018 1031 Rate/Dose Change 3 mcg/min Intravenous Jacob Givens RN    1/9/2018 1000 Rate/Dose Change 2 mcg/min Intravenous S [place], RN    1/8/2018 2025 New Bag 14 mcg/min Intravenous Galina Kevin RN    1/8/2018 2015 Rate/Dose Change 14 mcg/min Intravenous Galina Kevin RN    1/8/2018 2000 Rate/Dose Change 16 mcg/min Intravenous Galina Kevin RN    1/8/2018 1945 Rate/Dose C mcg/kg/min × 55.7 kg Intravenous Madelin Milian, HOWARD    1/9/2018 0800 Rate/Dose Verify 35 mcg/kg/min × 55.7 kg Intravenous Madelin Milian RN    1/9/2018 0735 New Bag 35 mcg/kg/min × 55.7 kg Intravenous Roberto Carlos Gayle RN    1/9/2018 0700 Rate/Dose (none) Lj Escobar, RN      Sodium Chloride 0.9 % solution     Date Action Dose Route User    1/9/2018 1100 Given 10 mL (none) Kvng Caldera RN      0.9%  NaCl infusion     Date Action Dose Route User    1/9/2018 1212 New Bag (none) Intravenous S Intravenous Arjun Diaz RN    1/8/2018 1758 Rate/Dose Change 0.02 Units/min Intravenous Arjun Diaz RN    1/8/2018 1750 Rate/Dose Change 0.01 Units/min Intravenous Arjun Diaz RN    1/8/2018 1733 New Bag 0.02 Units/min Intr she is ending a cooling protocol. She remains obtunded unresponsive. Her sister and brother are at the bedside.     Objective:   Blood pressure 98/50, pulse 54, temperature (!) 92.3 °F (33.5 °C), temperature source Rectal, resp.  rate 26, height 61.5\", w sinusitis. 4. Nonspecific debris in the nasopharynx, which may be related to intubation.  5. Lesser incidental findings as above.          Xr Chest Ap Portable  (cpt=71045)     Result Date: 1/8/2018  CONCLUSION: Bilateral airspace opacities can be seen with occasional ectopic ventricular beat -Combined atrial enlargement.  -Nonspecific ST depression + Nonspecific T-abnormality -Nondiagnostic.  ABNORMAL No previous ECGs available Electronically signed on 01/08/2018 at 13:02 by Bolivar Ba MD        Assessmen

## 2018-01-09 NOTE — PROGRESS NOTES
Beginning passive rewarming phase per protocol, has been in hypothermia x24 hours. Cooling machine set to standby, monitoring temps. Warm blankets applied. Wraps kept in place in case need for active rewarming after 8 hours per protocol.

## 2018-01-09 NOTE — PROGRESS NOTES
[facility]    Progress Note    Cody Garcia Patient Status:  Inpatient    1945 MRN [id number]   Location [facility] 2W/SW Attending Mary Lagunas MD   Hosp Day # 1 PCP Michelle Obregon MD       Subjective:   Oksana Villegas (H) 01/09/2018   BILT 1.5 (H) 01/09/2018   TP 5.1 (L) 01/09/2018   AST 1,035 (H) 01/09/2018   ALT 1,006 (H) 01/09/2018   PTT 28.9 01/09/2018   INR 1.5 (H) 01/08/2018   MG 1.9 01/09/2018   PHOS 4.3 01/09/2018   TROP 0.19 (HH) 01/09/2018   ETOH 3 01/08/2018 atelectasis. Underlying bacterial pneumonia is felt to be less likely but is also in the differential.        Ct Abdomen Pelvis Iv Contrast, No Oral (er)    Result Date: 1/8/2018  CONCLUSION: No acute process in the abdomen or pelvis.          Ekg 12-lead

## 2018-01-09 NOTE — PLAN OF CARE
Problem: RESPIRATORY - ADULT  Goal: Achieves optimal ventilation and oxygenation  INTERVENTIONS:  - Assess for changes in respiratory status  - Assess for changes in mentation and behavior  - Position to facilitate oxygenation and minimize respiratory effo Progressing  Cardiac rhythm as charted. Comments: Spoke with Dr. Darren Baldwin at 2115 to given patient update. MD aware of elevated lactic acid. Orders to increase NS to 100cc/hr. Will continue to repeat lactic until under 2.  Clarified lab orders with MD. Chem

## 2018-01-09 NOTE — PROGRESS NOTES
01/09/18 0905   Readings   Total RR 26   Minute Ventilation (L/min) 15 L/min   Expiratory Tidal Volume 550 mL   PIP Observed (cm H2O) 52 cm H2O   MAP (cm H2O) 17   Auto PEEP Observed (cm H2O) 8 cm H2O   Plateau Pressure (cm H2O) 29 cm H2O   RESPIRATORY

## 2018-01-09 NOTE — PROGRESS NOTES
Requiring significant pressors. Unable to place PICC. Right femoral line placed. Start paralytic given issues with ventilation and repeat ABG. Hypothermia protocol ongoing. Repeat labs pending. TTE pending. Also waiting on EEG. Family updated at bedside.  C

## 2018-01-10 NOTE — PROGRESS NOTES
[facility] called and spoke with Kimi Baez who will notify the team. Reference number [id number]. [id number] [facility] representative here. If family were to withdraw care, patient would not be eligible for organ donation but would be for eye.

## 2018-01-10 NOTE — PROGRESS NOTES
01/10/18 0814   Readings   Total RR 25   Minute Ventilation (L/min) 13.8 L/min   Expiratory Tidal Volume 540 mL   PIP Observed (cm H2O) 39 cm H2O   MAP (cm H2O) 13   Plateau Pressure (cm H2O) 31 cm H2O   RESPIRATORY THERAPY MECHANICAL VENTILATION PROGRE

## 2018-01-10 NOTE — PROGRESS NOTES
[facility]    Progress Note    Soheila Russell Patient Status:  Inpatient    1945 MRN [id number]   Location [facility] 2W/SW Attending Christelle Fraley MD   Hosp Day # 2 PCP Zahra Curran MD       [facility]ive[facility] 2. COPD. Diffuse bilateral interstitial opacities. Progressive focal airspace opacities in the right infrahilar region and left lung base, with a small left effusion. Findings may be due to edema or pneumonia.               Assessment and Plan:

## 2018-01-10 NOTE — CONSULTS
[facility]OSP - [facility]    Report of Consultation    Kris Rodriguez Patient Status:  Inpatient    1945 MRN [id number]   Location [facility] 2W/SW Attending Judith Feliz MD   Hosp Day # 2 PCP Jose Das MD     Date of Admissio Medications:    Current Facility-Administered Medications:  Heparin Sodium (Porcine) 5000 UNIT/ML injection 5,000 Units 5,000 Units Subcutaneous Q8H [address]   Buffered Aspirin (BUFFERIN) 325 MG tab 325 mg 325 mg Per NG Tube Daily   Pantoprazole Sodium (PROTONI opens her eyes with noxious stimuli, with decerebrate posturing. No response to verbal stimuli. No purposeful movements witnessed. Cranial Nerves: No response to visual threat. Pupils are midposition and slightly reactive.   Extraocular muscles are [place] has been off hypothermia and sedation. I will reexamine her in 24 hours, to help with prognosis. I did make the family aware of their options at this time. I discussed the case with Dr. Jose Rafael Posadas.     Thank you for allowing me to participate in the care

## 2018-01-10 NOTE — PLAN OF CARE
Problem: Safety Risk - Non-Violent Restraints  Goal: Patient will remain free from self-harm  INTERVENTIONS:  - Apply the least restrictive restraint to prevent harm  - Notify patient and family of reasons restraints applied  - Assess for any contributing weights   Outcome: Progressing    Goal: Absence of cardiac arrhythmias or at baseline  INTERVENTIONS:  - Continuous cardiac monitoring, monitor vital signs, obtain 12 lead EKG if indicated  - Evaluate effectiveness of antiarrhythmic and heart rate control

## 2018-01-10 NOTE — DIETARY NOTE
ADULT NUTRITION INITIAL ASSESSMENT    Pt is at high nutrition risk. Pt does not meet malnutrition criteria. RECOMMENDATIONS TO MD:   Tube feeds as ordered. See NUTRITION INTERVENTION for nutrient intake.       NUTRITION DIAGNOSIS/PROBLEM:  Jennifer Rose 26.03 kg/m².   BMI CLASSIFICATION: 25-29.9 kg/m2 - overweight  IBW: 108 lbs        129 % IBW  Usual Body Wt: ?122  lbs       115% UBW    WEIGHT HISTORY:  Patient Weight(s) for the past 336 hrs:   Weight   01/08/18 1100 63.5 kg (140 lb)   Wt Readings from Vinita Krishna (~1 ml/kcal  + evaporative losses)    MONITOR AND EVALUATE/NUTRITION GOALS:  - Food and Nutrient Administration:      Monitor: tolerance to enteral nutrition, adequacy of enteral nutrition and need for temporary enteral nutrition    - Anthropometric Measur

## 2018-01-10 NOTE — PROGRESS NOTES
ASSESSMENT/PLAN:    Impression: 1 cardiac arrest in a 59-year-old female with no prior cardiac history is currently intubated and unresponsive. Requiring pressors for bp support  2. Tobacco abuse   3. Elevated troponin.   Echo with ef 35%; abnormal aaliyah 126/69 (BP Location: Right arm)   Pulse 87   Temp (!) 92.3 °F (33.5 °C) (Rectal)   Resp 25   Ht 5' 1.5\" (1.562 m)   Wt 140 lb (63.5 kg)   SpO2 97%   BMI 26.03 kg/m²   CONS: well developed, well nourished. WEIGHT: HEAD/FACE:no trauma, normocephalic.  EYES:c

## 2018-01-10 NOTE — PROGRESS NOTES
Critical Care Progress Note     Assessment / Plan:  1. Cardiac arrest - unclear cause  - CTA chest negative for PE  - TTE with EF 30-35% with no WMAs  - completed hypothermia protocol  2.  Shock - +coronavirus, unlikely contributor to presentation  - off pr

## 2018-01-11 NOTE — PROGRESS NOTES
01/11/18 0841   Readings   Total RR 24   Minute Ventilation (L/min) 12.2 L/min   Expiratory Tidal Volume 560 mL   PIP Observed (cm H2O) 38 cm H2O   MAP (cm H2O) 16   Plateau Pressure (cm H2O) 31 cm H2O   RESPIRATORY THERAPY MECHANICAL VENTILATION PROGRE

## 2018-01-11 NOTE — PROGRESS NOTES
[facility]    Progress Note    Moshe Montelongo Patient Status:  Inpatient    1945 MRN [id number]   Location [facility] 2W/SW Attending Darien Keen MD   Hosp Day # 3 PCP Ambar Valles MD       Subjective:   Lindy John dextrose 10 % infusion  Intravenous Continuous PRN   Pantoprazole Sodium (PROTONIX) 40 mg in Sodium Chloride 0.9 % 10 mL IV push 40 mg Intravenous Q24H   norepinephrine (LEVOPHED) 4 mg/250 ml premix infusion 0.5-8 mcg/min Intravenous Continuous   propofo

## 2018-01-11 NOTE — PROGRESS NOTES
ASSESSMENT/PLAN:    Impression: 1 cardiac arrest in a 71-year-old female with no prior cardiac history is currently intubated and unresponsive. Requiring pressors for bp support  2. Tobacco abuse   3. Elevated troponin.   Echo with ef 35%; abnormal aaliyah 90   Temp (!) 92.3 °F (33.5 °C) (Rectal)   Resp 20   Ht 5' 1.5\" (1.562 m)   Wt 142 lb 6.7 oz (64.6 kg)   SpO2 94%   BMI 26.48 kg/m²   CONS: well developed, well nourished. WEIGHT: HEAD/FACE:no trauma, normocephalic.  EYES:conjunctiva not injected, no xanth

## 2018-01-11 NOTE — PROGRESS NOTES
[facility]    Progress Note    Roel Manrique Patient Status:  Inpatient    1945 MRN [id number]   Location [facility] 2W/SW Attending Mirtha Lopez MD   Hosp Day # 3 PCP Makayla Hidalgo MD       Subjective:     Patient Ap Portable  (cpt=71045)    Result Date: 1/10/2018  CONCLUSION:  1. ET tube and NG tube in place. 2. COPD. Diffuse bilateral interstitial opacities.  Progressive focal airspace opacities in the right infrahilar region and left lung base, with a small left e

## 2018-01-11 NOTE — PLAN OF CARE
Problem: Patient/Family Goals  Goal: Patient/Family Long Term Goal  Patient's Long Term Goal: patient will recover and go home   Interventions:  - care plan is being implemented.   - MD constantly updates patient's family of care plan  - See additional Care and temperature  - Assess for signs of decreased coronary artery perfusion - ex.  Angina  - Evaluate fluid balance, assess for edema, trend weights   Outcome: Progressing    Goal: Absence of cardiac arrhythmias or at baseline  INTERVENTIONS:  - Continuous c assessment   Outcome: Progressing    Goal: Achieves maximal functionality and self care  INTERVENTIONS  - Monitor swallowing and airway patency with patient fatigue and changes in neurological status  - Encourage and assist patient to increase activity and

## 2018-01-11 NOTE — PROCEDURES
[address] [facility], [address]      PATIENT'S NAME: Radha Barecanelo   ATTENDING PHYSICIAN: William Mckinney.  Rubén Mathias MD   PATIENT ACCOUNT #: [de-identified] LOCATION: [address] [place] [address] RECORD #: [id number] DATE OF BIRTH: [birth year]

## 2018-01-11 NOTE — PLAN OF CARE
NEUROLOGICAL - ADULT    • Achieves stable or improved neurological status Not Progressing    • Achieves maximal functionality and self care Not Progressing        Patient/Family Goals    • Patient/Family Long Term Goal Not Progressing    • Patient/Family S

## 2018-01-12 NOTE — PLAN OF CARE
Problem: Patient/Family Goals  Goal: Patient/Family Long Term Goal  Patient's Long Term Goal: patient will recover and go home   Interventions:  - care plan is being implemented.   - MD constantly updates patient's family of care plan  - See additional Care arrhythmias or at baseline  INTERVENTIONS:  - Continuous cardiac monitoring, monitor vital signs, obtain 12 lead EKG if indicated  - Evaluate effectiveness of antiarrhythmic and heart rate control medications as ordered  - Initiate emergency measures for l position. Hourly rounding done to assess patient needs. Will continue to monitor.
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
## 2018-01-12 NOTE — PROGRESS NOTES
[facility]    Progress Note    Finley Level Patient Status:  Inpatient    1945 MRN [id number]   Location [facility] 2W/SW Attending Suad Willoughby MD   Hosp Day # 4 PCP Ning Romero MD       Subjective:   Daniela Render Intravenous Daily   fentaNYL citrate (SUBLIMAZE) 0.05 MG/ML injection 25 mcg 25 mcg Intravenous Q1H PRN   Heparin Sodium (Porcine) 5000 UNIT/ML injection 5,000 Units 5,000 Units Subcutaneous Q8H [address]   Buffered Aspirin (BUFFERIN) 325 MG tab 325 mg 325 mg Pe

## 2018-01-12 NOTE — PROGRESS NOTES
[facility]    [facility] Note    John Rivera Patient Status:  Inpatient    1945 MRN [id number]   Location [facility] 2W/SW Attending Mercedez Baumann MD   Hosp Day # 4 PCP Dona Schaumann, MD       Subjective:   Bere Jacinto  01/12/2018   CO2 28 01/12/2018    (H) 01/12/2018   CA 7.7 (L) 01/12/2018   ALB 1.5 (L) 01/12/2018   ALKPHO 207 (H) 01/12/2018   BILT 0.7 01/12/2018   TP 5.5 (L) 01/12/2018   AST 83 (H) 01/12/2018    (H) 01/12/2018   PTT 27.0 01/10/20

## 2018-01-12 NOTE — PLAN OF CARE
Problem: Patient/Family Goals  Goal: Patient/Family Long Term Goal  Patient's Long Term Goal: patient will recover and go home   Interventions:  - care plan is being implemented.   - MD constantly updates patient's family of care plan  - See additional Care vasoactive medications to optimize hemodynamic stability  - Monitor arterial and/or venous puncture sites for bleeding and/or hematoma  - Assess quality of pulses, skin color and temperature  - Assess for signs of decreased coronary artery perfusion - ex. patient/family to notify RN of any seizure activity  - Instruct patient/family to call for assistance with activity based on assessment   Outcome: Progressing    Goal: Achieves maximal functionality and self care  INTERVENTIONS  - Monitor swallowing and ai
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
## 2018-01-13 NOTE — PROGRESS NOTES
Noticed patient with focal movements on right arm. Dr. Hector Johnson to the bedside. Unsure if seizure activity or not. 1mg Ativan given.
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
## 2018-01-13 NOTE — PLAN OF CARE
NEUROLOGICAL - ADULT    • Achieves stable or improved neurological status Not Progressing    • Absence of seizures Not Progressing          CARDIOVASCULAR - ADULT    • Maintains optimal cardiac output and hemodynamic stability Progressing    • Absence of c

## 2018-01-13 NOTE — PROGRESS NOTES
[facility]    Progress Note    Namitajenelle Mccullough Patient Status:  Inpatient    1945 MRN [id number]   Location [facility] 2W/SW Attending Carrie Johnson MD   Hosp Day # 5 PCP Bobby Talamantes MD       Subjective:   Leeann John 400 mg Intravenous Once   furosemide (LASIX) injection 40 mg 40 mg Intravenous Daily   fentaNYL citrate (SUBLIMAZE) 0.05 MG/ML injection 25 mcg 25 mcg Intravenous Q1H PRN   Heparin Sodium (Porcine) 5000 UNIT/ML injection 5,000 Units 5,000 Units Subcutaneou 01/12/2018   CREATSERUM 0.89 01/12/2018   BUN 18 01/12/2018    01/12/2018   K 3.6 01/13/2018    01/12/2018   CO2 28 01/12/2018    (H) 01/12/2018   CA 7.7 (L) 01/12/2018   ALB 1.5 (L) 01/12/2018   ALKPHO 207 (H) 01/12/2018   BILT 0.7 01/1

## 2018-01-13 NOTE — PROGRESS NOTES
[facility]    Progress Note    Baljeet Brown Patient Status:  Inpatient    1945 MRN [id number]   Location [facility] 2W/SW Attending Carlos Adkins MD   Hosp Day # 5 PCP Avi Chappell MD       Subjective:     Non res

## 2018-01-13 NOTE — PLAN OF CARE
Problem: Patient/Family Goals  Goal: Patient/Family Long Term Goal  Patient's Long Term Goal: patient will recover and go home   Interventions:  - care plan is being implemented.   - MD constantly updates patient's family of care plan  - See additional Care arrhythmias or at baseline  INTERVENTIONS:  - Continuous cardiac monitoring, monitor vital signs, obtain 12 lead EKG if indicated  - Evaluate effectiveness of antiarrhythmic and heart rate control medications as ordered  - Initiate emergency measures for l

## 2018-01-13 NOTE — PROGRESS NOTES
DMG paged for update on potential seizure activity. Okay to start propofol. Noted red urine with blood clots. Robertson flushed. No further orders given. Ativan, Depacon, Dilantin ordered per Dom. Will continue to monitor.

## 2018-01-14 NOTE — PROCEDURES
[address] [facility], [address]      PATIENT'S NAME: Sebas Rival   ATTENDING PHYSICIAN: Tavo Aguayo.  Robin Peterson MD   PATIENT ACCOUNT #: [de-identified] LOCATION: [address] [place] [address] RECORD #: [id number] DATE OF BIRTH: [birth year]

## 2018-01-14 NOTE — PROGRESS NOTES
[facility] - [facility]    Progress Note    Gilberto Jones Patient Status:  Inpatient    1945 MRN [id number]   Location [facility] 2W/SW Attending Jenny Landeros MD   Hosp Day # 6 PCP Therese Bunn MD       Subjective:     Non res predominant air space disease and coarse interstitial markings redemonstrated without significant change.               Dayron Alexander MD  1/14/2018

## 2018-01-14 NOTE — PROGRESS NOTES
[facility]    Progress Note    Noble Dillard Patient Status:  Inpatient    1945 MRN [id number]   Location [facility] 2W/SW Attending Meliton Duane, MD   Hosp Day # 6 PCP Harsha Amos MD       Subjective:   Liz Villa Daily   dextrose 10 % infusion  Intravenous Continuous PRN   Pantoprazole Sodium (PROTONIX) 40 mg in Sodium Chloride 0.9 % 10 mL IV push 40 mg Intravenous Q24H   norepinephrine (LEVOPHED) 4 mg/250 ml premix infusion 0.5-8 mcg/min Intravenous Continuous   p bibasilar predominant air space disease and coarse interstitial markings redemonstrated without significant change.               Jesús Lee MD, MD  1/14/2018
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
## 2018-01-14 NOTE — PLAN OF CARE
CARDIOVASCULAR - ADULT    • Absence of cardiac arrhythmias or at baseline Not Progressing        NEUROLOGICAL - ADULT    • Achieves stable or improved neurological status Not Progressing    • Absence of seizures Not Progressing    • Achieves maximal functi

## 2018-01-15 NOTE — PLAN OF CARE
Problem: Patient/Family Goals  Goal: Patient/Family Long Term Goal  Patient's Long Term Goal: patient will recover and go home   Interventions:  - care plan is being implemented.   - MD constantly updates patient's family of care plan  - See additional Care arrhythmias or at baseline  INTERVENTIONS:  - Continuous cardiac monitoring, monitor vital signs, obtain 12 lead EKG if indicated  - Evaluate effectiveness of antiarrhythmic and heart rate control medications as ordered  - Initiate emergency measures for l assistive/communication devices   Outcome: Not Progressing      Comments: Patient appears the same clinically. She is having more frequent tremors from her right and left arms as well as face and torso.  Family at bedside requesting the sedation be turned o
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
## 2018-01-15 NOTE — PROGRESS NOTES
01/14/18 2016   Clinical Encounter Type   Visited With Family   Routine Visit Follow-up   Continue Visiting Yes   Crisis Visit Critical care   Patient's Supportive Strategies/Resources Family    Referral From Nurse   Referral To [facility]

## 2018-01-15 NOTE — PROGRESS NOTES
RESPIRATORY THERAPY MECHANICAL VENTILATION PROGRESS NOTE    Ventilator Weaning:  Patient meets criteria for weaning? no Weaning was attempted no.           01/15/18 0820   Readings   Total RR 18   Minute Ventilation (L/min) 9.4 L/min   Expiratory Tidal Vol

## 2018-01-15 NOTE — PROGRESS NOTES
Critical Care Progress Note     Assessment / Plan:  1. Cardiac arrest - unclear cause  - CTA chest negative for PE  - TTE with EF 30-35% with no WMAs  - completed hypothermia protocol  2.  Shock - +coronavirus, unlikely contributor to presentation  - wean n

## 2018-01-15 NOTE — PROGRESS NOTES
[facility]    Progress Note    Roel Manrique Patient Status:  Inpatient    1945 MRN [id number]   Location [facility] 2W/SW Attending Mirtha Lopez MD   Hosp Day # 7 PCP Makayla Hidalgo MD       Subjective:     Patient

## 2018-01-16 NOTE — DIETARY NOTE
ADULT NUTRITION RE- ASSESSMENT    Pt is at high nutrition risk. Pt does not meet malnutrition criteria. RECOMMENDATIONS TO MD:  Tube feeds on hold (dc'ed this am) until family decision tomorrow to resume.       NUTRITION DIAGNOSIS/PROBLEM:  Luciana Epley ANTHROPOMETRICS:  HT: 156.2 cm (5' 1.5\")  WT: 63.5 kg (140 lb) on 1/8. No current wt. Need to monitor repeat weight for accuracy. This wt may reflect some fluid gain. (141 lb) on 1/15 stable. BMI: Body mass index is 26.38 kg/m².   BMI CLASSIFICA visual exam  .  - Skin Integrity: RN documentation reviewed. no breakdown.   - Harsha score 12- high skin risk.      NUTRITION PRESCRIPTION:  Diet: NPO  Oral Supplements NPO  Estimated Nutritional Needs:    Calories: ~1475 calories/day (23 calories per kg)

## 2018-01-16 NOTE — PROGRESS NOTES
[facility]    Progress Note    Roel Manrique Patient Status:  Inpatient    1945 MRN [id number]   Location [facility] 2W/SW Attending Mirtha Lopez MD   Hosp Day # 8 PCP Makayla Hidalgo MD       Subjective:   Mariam Nguyen dextrose 10 % infusion  Intravenous Continuous PRN   Pantoprazole Sodium (PROTONIX) 40 mg in Sodium Chloride 0.9 % 10 mL IV push 40 mg Intravenous Q24H   norepinephrine (LEVOPHED) 4 mg/250 ml premix infusion 0.5-8 mcg/min Intravenous Continuous   propofo

## 2018-01-16 NOTE — CM/SW NOTE
CHRISTINE received for hospice, following extubation. Referral sent via [employer] [facility] [employer] and call placed to [facility] [id number].     ELLA arthur [id number]

## 2018-01-16 NOTE — PROGRESS NOTES
[facility]    CDI Prediction Tool Protocol (Vancomycin Prophylaxis Deferred)      This patient is currently at high risk for developing CDI due to his/her score being >/= 13 points.   The current score is: 15    Score Breakdown:  High risk antibi

## 2018-01-16 NOTE — PLAN OF CARE
NEUROLOGICAL - ADULT    • Achieves stable or improved neurological status Not Progressing        RESPIRATORY - ADULT    • Achieves optimal ventilation and oxygenation  Remains intubated, plan to wean off tomorrow Not Progressing          CARDIOVASCULAR - A

## 2018-01-16 NOTE — HOSPICE RN NOTE
[facility] met with family. POA  And other family members. They wanted the n/g replaced and feedings resumed. They also wanted IV fluids. We discussed EOL and nutrition and they felt we were starving pt.   Dr. Erik Nieto was notified by hospital staff

## 2018-01-16 NOTE — PROCEDURES
[address] [facility] [facility] [address]      PATIENT'S NAME: Jaradaftab Trent   ATTENDING PHYSICIAN: Dada Shannon.  Jazmin Dillard MD   PATIENT ACCOUNT #: [de-identified] LOCATION: [address] [place] [address] RECORD #: [id number] DATE OF BIRTH: [birth year]

## 2018-01-16 NOTE — PLAN OF CARE
Problem: Patient/Family Goals  Goal: Patient/Family Long Term Goal  Patient's Long Term Goal: transition to comfort care  Interventions:  - care plan is being implemented.   - MD constantly updates patient's family of care plan  - See additional Care Plan g baseline  INTERVENTIONS:  - Continuous cardiac monitoring, monitor vital signs, obtain 12 lead EKG if indicated  - Evaluate effectiveness of antiarrhythmic and heart rate control medications as ordered  - Initiate emergency measures for life threatening ar Outcome: Progressing      Comments: Patient transitioned to comfort care. Propofol and tube feedings discontinued, morphine gtt started. Hospice to see patient and discuss with family.  Patient was terminally extubated at 11:13

## 2018-01-16 NOTE — PROGRESS NOTES
Regine Hanna Patient Status:  Inpatient    1945 MRN [id number]   Location [facility] 2W/SW Attending Bernice Monroy MD   Hosp Day # 8 PCP Orquidea Diaz MD     Critical Care Progress Note    Assessment/Plan:  1.  Cardiac arrest - u hours) at 01/16/18 0758  Last data filed at 01/16/18 0600   Gross per 24 hour   Intake             1357 ml   Output             2025 ml   Net             -668 ml       /57 (BP Location: Left arm)   Pulse 73   Temp 99.5 °F (37.5 °C)   Resp 16   Ht 5'

## 2018-01-17 PROBLEM — Z71.89 ADVANCE CARE PLANNING: Status: ACTIVE | Noted: 2018-01-01

## 2018-01-17 PROBLEM — Z71.89 GOALS OF CARE, COUNSELING/DISCUSSION: Status: ACTIVE | Noted: 2018-01-01

## 2018-01-17 NOTE — PROGRESS NOTES
I spoke with her nurse tonight. Events of today reviewed. Patient was not reevaluated today. Will sign off. Please call with questions. Thank you for consult.

## 2018-01-17 NOTE — PROGRESS NOTES
cardiology  Chart reviewed and d/w rn  Agree with plan per dianna forte and lynn  Will follow peripherally  Please call with any questions  Thanks

## 2018-01-17 NOTE — DIETARY NOTE
NUTRITION NOTE UPDATE:   Received consult to resume tube feeds per family wishes. NUTRITION INTERVENTION:  - Enteral Nutrition:  Resume Osmolite 1.2 at goal rate of  55 ml/hr.   Goal rate provides 1450 kcal, 67 grams protein, 980ml total   free water, and

## 2018-01-17 NOTE — PLAN OF CARE
Problem: Patient/Family Goals  Goal: Patient/Family Long Term Goal  Patient's Long Term Goal: transition to comfort care  Interventions:  - care plan is being implemented.   - MD constantly updates patient's family of care plan  - See additional Care Plan g baseline  INTERVENTIONS:  - Continuous cardiac monitoring, monitor vital signs, obtain 12 lead EKG if indicated  - Evaluate effectiveness of antiarrhythmic and heart rate control medications as ordered  - Initiate emergency measures for life threatening ar devices   Outcome: Progressing

## 2018-01-17 NOTE — PROGRESS NOTES
Critical Care Progress Note     Assessment / Plan:  1. Cardiac arrest  - CTA chest negative for PE  - TTE with EF 30-35% with no WMAs  - completed hypothermia protocol  2.  Shock - +coronavirus, unlikely contributor to presentation  - lactic acid normalized 10L    Physical Exam:  General - NAD  Respiratory - clear breath sounds bilaterally  Cardiovascular - RRR, no MRG  Abdo - soft, NTND  Ext - no edema  Skin - no rashes  Mental status - unresponsive    Medications:  Reviewed in EMR    Lab Data:  Reviewed in

## 2018-01-17 NOTE — PROGRESS NOTES
[facility]    Progress Note    Soheila Russell Patient Status:  Inpatient    1945 MRN [id number]   Location [facility] 2W/SW Attending Christelle Farley MD   Hosp Day # 9 PCP Zahra Curran MD       Subjective:   [facility] thyroid not enlarged, symmetric, no tenderness/mass/nodules  Pulmonary: Bibasilar rales no wheezing  Cardiovascular: S1, S2 normal, no murmur, click, rub or gallop, regular rate and rhythm  Abdominal: soft, non-tender; bowel sounds normal; no masses,  no o

## 2018-01-17 NOTE — CONSULTS
Kierra Patient Status:  Inpatient    1945 MRN [id number]   Location [facility] 2W/SW Attending Sea Vazquez MD   Hosp Day # 9 PCP Dayna Cherry MD     Date of C very active person prior to this admission. They tell me she was exercising regularly at [employer] and went there almost daily. They describe her as a vibrant person. She is of [employer] j[employer] and family appreciative of  support.      I discussed reason IV's.\"  I did discuss risk of worsening respiratory failure/congestion and FVO with feeding tubes/IVF's. Family would like dobhoff temporary feeding tube and possibly g-tube placement if she has ongoing survival and stability.  I did discuss if her clinica Intravenous, PRN  •  morphINE sulfate (PF) 2 MG/ML injection 1 mg, 1 mg, Intravenous, Q1H PRN  •  morphine sulfate 50 mg in sodium chloride 0.9 % 50 mL infusion, 1-5 mg/hr, Intravenous, Continuous PRN  •  glycopyrrolate (ROBINUL) 0.2 MG/ML injection 0.2 mg 27.0 01/10/2018       Chemistry:  Lab Results  Component Value Date   CREATSERUM 1.03 01/14/2018   BUN 21 (H) 01/14/2018    01/14/2018   K 4.5 01/17/2018    01/14/2018   CO2 34 (H) 01/14/2018    (H) 01/14/2018   CA 7.6 (L) 01/14/2018   A Spiritual Care    Disposition: ongoing goals of care discussions    Procedures:  No intubation  No bipap  Family would like temporary feeding tube and potentially g-tube pending clinical course      Assessment/Recommendations:  Cardiac arrest ([facility] [address]    Hypo

## 2018-01-18 NOTE — PROGRESS NOTES
Report given to [facility], patient transferred to 442 stable vital signs. Family with patient.

## 2018-01-18 NOTE — PROGRESS NOTES
[facility]    Progress Note    Eva Kauffman Patient Status:  Inpatient    1945 MRN [id number]   Location [facility] 4W/SW/SE Attending Arnav Garg MD   Hosp Day # 10 PCP Michael Montano MD       Subjective:   Dusty Roman 01/11/2018   TROP 0.19 (HH) 01/09/2018   ETOH 3 01/08/2018       Xr Chest Ap Portable  (cpt=71045)    Result Date: 1/17/2018  CONCLUSION:   Moderate interstitial edema, unchanged. Feeding tube with tip at the level of the gastric pylorus.             Asses

## 2018-01-18 NOTE — CONSULTS
[facility]  Palliative Care Follow Up    Anh Child Patient Status:  Inpatient    1945 MRN [id number]   Location [facility] 4W/SW/SE Attending Lars Caba MD   Hosp Day # 10 PCP Alexx Parker MD     Date of Con time I spent with them and encouraged life review and reminiscence of Rossy's life.     Review of Systems:  Unable to obtain ROS due to unresponsiveness      Allergies:  No Known Allergies    Medications:     Current Facility-Administered Medications:   • (L) 01/14/2018   ALB 1.5 (L) 01/12/2018   ALKPHO 207 (H) 01/12/2018   BILT 0.7 01/12/2018   TP 5.5 (L) 01/12/2018   AST 83 (H) 01/12/2018    (H) 01/12/2018   MG 2.5 01/17/2018   PHOS 2.3 (L) 01/11/2018   TROP 0.19 (HH) 01/09/2018       Imaging:  Xr Comfort care only    Hospitalization:  Do not transfer to hospital or emergency room unless for comfort    Procedures:  No intubation  No further feeding tubes  No hemodialysis  No further blood draws/diagnostics    Medications:  DC all non-comfort med visit with Dr. Lisa De La Cruz and Faviola Vu RN    I will continue to follow clinically.       SERGIO España, [facility] [id number]  1/18/2018  3:02 PM

## 2018-01-18 NOTE — PROCEDURES
[facility], [address]      PATIENT'S NAME: Verenice Dangelo   ATTENDING PHYSICIAN: Keren Wilkes.  Chyna Dias MD   PATIENT ACCOUNT #: [de-identified] LOCATION: [address] [place] [address] RECORD #: [id number] DATE OF BIRTH:

## 2018-01-18 NOTE — PROGRESS NOTES
Pulmonary Progress Note      NAME: Noble Dillard - [facility]OM: [facility] - MRN: [id number] - Age: 71year old - : 1945    Assessment/Plan:  1. Encephalopathy and myoclonus - likely has severe anoxic brain injury, EEG without significant activity.   - ant (64.4 kg)   SpO2 91%   BMI 26.38 kg/m²   Physical Exam:   General: does not open eyes, no significant distress. DHT in place    No results for input(s): RBC, HGB, HCT, MCV, MCH, MCHC, RDW, NEPRELIM, WBC, PLT in the last 72 hours.   Recent Labs   Lab  01/16/

## 2018-01-18 NOTE — PROGRESS NOTES
01/18/18 1545   Clinical Encounter Type   Visited With Family   Routine Visit Follow-up   Continue Visiting Yes   Referral From (Palliative Care)   Patient Spiritual Encounters   Spiritual Assessment Completed 2   Feelings of Loneliness/Hopelessness Non

## 2018-01-18 NOTE — PLAN OF CARE
- Pt on 10 L HFNC, suction as able  - Tube feeds resumed at family request  - Palliative care consulted; will transfer to 4th floor when bed available. - Pt interpreting pt's reflex movement as purposeful response, despite RN explaining otherwise.

## 2018-01-19 NOTE — PROGRESS NOTES
[facility]    [facility]    Stalin Corona Patient Status:  Inpatient    1945 MRN [id number]   Location [facility] 4W/SW/SE Attending Phoebe Austin MD   Hosp Day # 6 PCP Nayla Raymond MD       Subjective:   Beula Parents 2.3 (L) 01/11/2018   TROP 0.19 (HH) 01/09/2018   ETOH 3 01/08/2018       Xr Chest Ap Portable  (cpt=71045)    Result Date: 1/17/2018  CONCLUSION:   Moderate interstitial edema, unchanged. Feeding tube with tip at the level of the gastric pylorus.

## 2018-01-19 NOTE — CONSULTS
[place] [facility] - [facility]  Palliative Care Follow Up    Anh Child Patient Status:  Inpatient    1945 MRN [id number]   Location [facility] 4W/SW/SE Attending Lars Caba MD   Hosp Day # 6 PCP Alexx Parker MD     Date of Con POLST form was completed for hospice and original given to sister and copies made for chart. Provided emotional support to pt's sister who is coping adequately.           Review of Systems:  Unable to obtain ROS due to unresponsiveness      Allergies:  No K 01/14/2018   CA 7.6 (L) 01/14/2018   ALB 1.5 (L) 01/12/2018   ALKPHO 207 (H) 01/12/2018   BILT 0.7 01/12/2018   TP 5.5 (L) 01/12/2018   AST 83 (H) 01/12/2018    (H) 01/12/2018   MG 2.5 01/17/2018   PHOS 2.3 (L) 01/11/2018   TROP 0.19 (HH) 01/09/2018 addressed: Referral placed for Spiritual Care    Disposition: home hospice     Comfort care only    Hospitalization:  Do not transfer to hospital or emergency room unless for comfort    Procedures:  No intubation  No further feeding tubes  No hemodialysis Dr. Monse Ulloa and Flo Brittle RN    I will follow up on Monday if still hospitalized. Please page Dr. Pema Ratliff over the weekend with any palliative care needs as we will continue to follow until transition to hospice care.       SERGIO Damon, [address]

## 2018-01-19 NOTE — CM/SW NOTE
The pt.'s family wants to take the pt. Home with hospice. The pt's family does not want to use [facility] and would prefer to use another agency. The pt's sister Barbara Ortiz, contacted [facility] hospice and Season's met with the pt.  And her sister and th

## 2018-01-19 NOTE — PLAN OF CARE
Pt resting in bed. Family at bedside. Pt appears diaphoretic and with labored breathing at times. Family educated on use of prn morphine for air hunger and pain, but declines to have patient receive any morphine at this time.  Morphine gtt continued at 1 mg

## 2018-01-20 NOTE — DISCHARGE SUMMARY
[place] [facility] - [facility]    Discharge Summary    Flaco Patten Patient Status:  Inpatient    1945 MRN [id number]   Location [facility] 4W/SW/SE Attending No att. providers found   Hosp Day # 11 PCP Sánchez Sheldon MD     Date of Admissi Consulting Physician  PULMONARY DISEASES    [facility]ing Physician  Cardiovascular Diseases    Carmencita Torres MD Consulting Physician  Internal Medicine    Brook Moritz, MD Consulting Physician  Internal Medicine

## 2018-01-22 NOTE — PAYOR COMM NOTE
--------------  DISCHARGE REVIEW    Payor: [facility] [place] #:  [id number]  Authorization Number: [id number]    Admit date: 1/8/18  Admit time:  1100  Discharge Date: 1/19/2018  8:18 PM     Admitting Physician: MD JESUS Carrasco sounds are normal third heart sound present  Abdomen bowel sounds present no guarding or rebound    Hospital Course: Patient treated aggressively in the intensive care unit however neurological return did not occur patient had to EEGs which showed no corti

## 2018-01-28 PROBLEM — C50.412 MALIGNANT NEOPLASM OF UPPER-OUTER QUADRANT OF LEFT FEMALE BREAST (HCC): Status: RESOLVED | Noted: 2018-01-01 | Resolved: 2018-01-28

## 2018-01-28 PROBLEM — Z85.3 HISTORY OF BREAST CANCER: Status: ACTIVE | Noted: 2018-01-28

## (undated) NOTE — LETTER
[facility], [place]  Authorization for Invasive Procedures  1.  I hereby authorize Dr. Jamaal Fonseca , my physician and whomever may be designated as the doctor's assistant, to perform the following operation and/or procedure:  *** on Shila Garcia performed for the purposes of advancing medicine, science, and/or education, provided my identity is not revealed. If the procedure has been videotaped, the physician/surgeon will obtain the original videotape.  The hospital will not be responsible for stor My signature below affirms that prior to the time of the procedure, I have explained to the patient and/or her legal representative, the risks and benefits involved in the proposed treatment and any reasonable alternative to the proposed treatment.  I have

## (undated) NOTE — IP AVS SNAPSHOT
[facility]            (For Outpatient Use Only) Initial Admit Date: 1/8/2018   Inpt/Obs Admit Date: Inpt: 1/8/18 / Obs: N/A   Discharge Date:    Jagdish Mcpherson:  [de-identified]   MRN: [de-identified]   CSN: [id number]        ENCOUNTER  Patient Class: Hospital Account Financial Class: Medicare Advantage    January 19, 2018

## (undated) NOTE — LETTER
[address] [facility], [facility] [address]     I agree to have a Peripherally Inserted Central Catheter (PICC) placed in my arm.      1. The PICC insertion procedure, care, maintenance, risks, benefits, and complications Statement of Physician: My signature below affirms that prior to the time of the PICC line insertion, I have explained to the patient and/or his/her legal representative, the risks and benefits involved in the proposed treatment and any reasonable alternat